# Patient Record
Sex: MALE | Race: OTHER | NOT HISPANIC OR LATINO
[De-identification: names, ages, dates, MRNs, and addresses within clinical notes are randomized per-mention and may not be internally consistent; named-entity substitution may affect disease eponyms.]

---

## 2017-03-21 ENCOUNTER — APPOINTMENT (OUTPATIENT)
Dept: CARDIOLOGY | Facility: CLINIC | Age: 57
End: 2017-03-21

## 2017-03-21 ENCOUNTER — NON-APPOINTMENT (OUTPATIENT)
Age: 57
End: 2017-03-21

## 2017-03-21 VITALS
HEIGHT: 65 IN | DIASTOLIC BLOOD PRESSURE: 80 MMHG | HEART RATE: 60 BPM | WEIGHT: 196 LBS | BODY MASS INDEX: 32.65 KG/M2 | OXYGEN SATURATION: 98 % | SYSTOLIC BLOOD PRESSURE: 140 MMHG

## 2017-03-21 DIAGNOSIS — Z86.39 PERSONAL HISTORY OF OTHER ENDOCRINE, NUTRITIONAL AND METABOLIC DISEASE: ICD-10-CM

## 2017-03-21 RX ORDER — NAPROXEN 500 MG/1
500 TABLET ORAL
Qty: 14 | Refills: 0 | Status: ACTIVE | COMMUNITY
Start: 2017-02-18

## 2017-03-21 RX ORDER — ATORVASTATIN CALCIUM 20 MG/1
20 TABLET, FILM COATED ORAL
Qty: 30 | Refills: 0 | Status: ACTIVE | COMMUNITY
Start: 2016-07-18

## 2017-10-12 ENCOUNTER — NON-APPOINTMENT (OUTPATIENT)
Age: 57
End: 2017-10-12

## 2017-10-12 ENCOUNTER — APPOINTMENT (OUTPATIENT)
Dept: CARDIOLOGY | Facility: CLINIC | Age: 57
End: 2017-10-12
Payer: COMMERCIAL

## 2017-10-12 VITALS
BODY MASS INDEX: 32.65 KG/M2 | OXYGEN SATURATION: 98 % | SYSTOLIC BLOOD PRESSURE: 142 MMHG | HEART RATE: 69 BPM | HEIGHT: 65 IN | DIASTOLIC BLOOD PRESSURE: 80 MMHG | WEIGHT: 196 LBS

## 2017-10-12 DIAGNOSIS — I10 ESSENTIAL (PRIMARY) HYPERTENSION: ICD-10-CM

## 2017-10-12 DIAGNOSIS — R73.09 OTHER ABNORMAL GLUCOSE: ICD-10-CM

## 2017-10-12 DIAGNOSIS — E66.9 OBESITY, UNSPECIFIED: ICD-10-CM

## 2017-10-12 PROCEDURE — 99214 OFFICE O/P EST MOD 30 MIN: CPT

## 2017-10-12 PROCEDURE — 93000 ELECTROCARDIOGRAM COMPLETE: CPT

## 2020-01-01 ENCOUNTER — INPATIENT (INPATIENT)
Facility: HOSPITAL | Age: 60
LOS: 7 days | DRG: 207 | End: 2020-12-13
Attending: INTERNAL MEDICINE | Admitting: STUDENT IN AN ORGANIZED HEALTH CARE EDUCATION/TRAINING PROGRAM
Payer: COMMERCIAL

## 2020-01-01 VITALS
RESPIRATION RATE: 24 BRPM | SYSTOLIC BLOOD PRESSURE: 190 MMHG | OXYGEN SATURATION: 57 % | HEART RATE: 79 BPM | TEMPERATURE: 99 F | DIASTOLIC BLOOD PRESSURE: 76 MMHG

## 2020-01-01 VITALS — RESPIRATION RATE: 35 BRPM

## 2020-01-01 DIAGNOSIS — U07.1 COVID-19: ICD-10-CM

## 2020-01-01 LAB
-  COAGULASE NEGATIVE STAPHYLOCOCCUS: SIGNIFICANT CHANGE UP
A1C WITH ESTIMATED AVERAGE GLUCOSE RESULT: 6.6 % — HIGH (ref 4–5.6)
ALBUMIN SERPL ELPH-MCNC: 1.3 G/DL — LOW (ref 3.3–5.2)
ALBUMIN SERPL ELPH-MCNC: 1.8 G/DL — LOW (ref 3.3–5.2)
ALBUMIN SERPL ELPH-MCNC: 2 G/DL — LOW (ref 3.3–5.2)
ALBUMIN SERPL ELPH-MCNC: 2.2 G/DL — LOW (ref 3.3–5.2)
ALBUMIN SERPL ELPH-MCNC: 2.9 G/DL — LOW (ref 3.3–5.2)
ALBUMIN SERPL ELPH-MCNC: 3 G/DL — LOW (ref 3.3–5.2)
ALBUMIN SERPL ELPH-MCNC: 3.1 G/DL — LOW (ref 3.3–5.2)
ALBUMIN SERPL ELPH-MCNC: 3.2 G/DL — LOW (ref 3.3–5.2)
ALBUMIN SERPL ELPH-MCNC: 3.2 G/DL — LOW (ref 3.3–5.2)
ALBUMIN SERPL ELPH-MCNC: 3.7 G/DL — SIGNIFICANT CHANGE UP (ref 3.3–5.2)
ALP SERPL-CCNC: 102 U/L — SIGNIFICANT CHANGE UP (ref 40–120)
ALP SERPL-CCNC: 105 U/L — SIGNIFICANT CHANGE UP (ref 40–120)
ALP SERPL-CCNC: 107 U/L — SIGNIFICANT CHANGE UP (ref 40–120)
ALP SERPL-CCNC: 108 U/L — SIGNIFICANT CHANGE UP (ref 40–120)
ALP SERPL-CCNC: 110 U/L — SIGNIFICANT CHANGE UP (ref 40–120)
ALP SERPL-CCNC: 116 U/L — SIGNIFICANT CHANGE UP (ref 40–120)
ALP SERPL-CCNC: 120 U/L — SIGNIFICANT CHANGE UP (ref 40–120)
ALP SERPL-CCNC: 122 U/L — HIGH (ref 40–120)
ALP SERPL-CCNC: 138 U/L — HIGH (ref 40–120)
ALP SERPL-CCNC: 139 U/L — HIGH (ref 40–120)
ALP SERPL-CCNC: 75 U/L — SIGNIFICANT CHANGE UP (ref 40–120)
ALT FLD-CCNC: 17 U/L — SIGNIFICANT CHANGE UP
ALT FLD-CCNC: 19 U/L — SIGNIFICANT CHANGE UP
ALT FLD-CCNC: 20 U/L — SIGNIFICANT CHANGE UP
ALT FLD-CCNC: 21 U/L — SIGNIFICANT CHANGE UP
ALT FLD-CCNC: 21 U/L — SIGNIFICANT CHANGE UP
ALT FLD-CCNC: 22 U/L — SIGNIFICANT CHANGE UP
ALT FLD-CCNC: 22 U/L — SIGNIFICANT CHANGE UP
ALT FLD-CCNC: 29 U/L — SIGNIFICANT CHANGE UP
ANION GAP SERPL CALC-SCNC: 10 MMOL/L — SIGNIFICANT CHANGE UP (ref 5–17)
ANION GAP SERPL CALC-SCNC: 11 MMOL/L — SIGNIFICANT CHANGE UP (ref 5–17)
ANION GAP SERPL CALC-SCNC: 12 MMOL/L — SIGNIFICANT CHANGE UP (ref 5–17)
ANION GAP SERPL CALC-SCNC: 13 MMOL/L — SIGNIFICANT CHANGE UP (ref 5–17)
ANION GAP SERPL CALC-SCNC: 14 MMOL/L — SIGNIFICANT CHANGE UP (ref 5–17)
ANION GAP SERPL CALC-SCNC: 14 MMOL/L — SIGNIFICANT CHANGE UP (ref 5–17)
ANION GAP SERPL CALC-SCNC: 22 MMOL/L — HIGH (ref 5–17)
ANISOCYTOSIS BLD QL: SLIGHT — SIGNIFICANT CHANGE UP
ANISOCYTOSIS BLD QL: SLIGHT — SIGNIFICANT CHANGE UP
APTT BLD: 27.4 SEC — LOW (ref 27.5–35.5)
APTT BLD: 49.2 SEC — HIGH (ref 27.5–35.5)
APTT BLD: 53.4 SEC — HIGH (ref 27.5–35.5)
APTT BLD: 56 SEC — HIGH (ref 27.5–35.5)
APTT BLD: 57.4 SEC — HIGH (ref 27.5–35.5)
APTT BLD: 57.9 SEC — HIGH (ref 27.5–35.5)
APTT BLD: 64.3 SEC — HIGH (ref 27.5–35.5)
APTT BLD: 92.9 SEC — HIGH (ref 27.5–35.5)
AST SERPL-CCNC: 18 U/L — SIGNIFICANT CHANGE UP
AST SERPL-CCNC: 23 U/L — SIGNIFICANT CHANGE UP
AST SERPL-CCNC: 27 U/L — SIGNIFICANT CHANGE UP
AST SERPL-CCNC: 28 U/L — SIGNIFICANT CHANGE UP
AST SERPL-CCNC: 28 U/L — SIGNIFICANT CHANGE UP
AST SERPL-CCNC: 29 U/L — SIGNIFICANT CHANGE UP
AST SERPL-CCNC: 30 U/L — SIGNIFICANT CHANGE UP
AST SERPL-CCNC: 34 U/L — SIGNIFICANT CHANGE UP
AST SERPL-CCNC: 38 U/L — SIGNIFICANT CHANGE UP
AST SERPL-CCNC: 40 U/L — HIGH
AST SERPL-CCNC: 42 U/L — HIGH
AST SERPL-CCNC: 46 U/L — HIGH
AST SERPL-CCNC: 50 U/L — HIGH
BASE EXCESS BLDA CALC-SCNC: 5.9 MMOL/L — HIGH (ref -3–3)
BASE EXCESS BLDA CALC-SCNC: 7 MMOL/L — HIGH (ref -3–3)
BASOPHILS # BLD AUTO: 0 K/UL — SIGNIFICANT CHANGE UP (ref 0–0.2)
BASOPHILS # BLD AUTO: 0 K/UL — SIGNIFICANT CHANGE UP (ref 0–0.2)
BASOPHILS # BLD AUTO: 0.01 K/UL — SIGNIFICANT CHANGE UP (ref 0–0.2)
BASOPHILS NFR BLD AUTO: 0 % — SIGNIFICANT CHANGE UP (ref 0–2)
BASOPHILS NFR BLD AUTO: 0 % — SIGNIFICANT CHANGE UP (ref 0–2)
BASOPHILS NFR BLD AUTO: 0.1 % — SIGNIFICANT CHANGE UP (ref 0–2)
BILIRUB DIRECT SERPL-MCNC: 0.1 MG/DL — SIGNIFICANT CHANGE UP (ref 0–0.3)
BILIRUB DIRECT SERPL-MCNC: 0.1 MG/DL — SIGNIFICANT CHANGE UP (ref 0–0.3)
BILIRUB DIRECT SERPL-MCNC: 0.2 MG/DL — SIGNIFICANT CHANGE UP (ref 0–0.3)
BILIRUB DIRECT SERPL-MCNC: 0.2 MG/DL — SIGNIFICANT CHANGE UP (ref 0–0.3)
BILIRUB DIRECT SERPL-MCNC: 1 MG/DL — HIGH (ref 0–0.3)
BILIRUB INDIRECT FLD-MCNC: 0.1 MG/DL — LOW (ref 0.2–1)
BILIRUB INDIRECT FLD-MCNC: 0.3 MG/DL — SIGNIFICANT CHANGE UP (ref 0.2–1)
BILIRUB INDIRECT FLD-MCNC: 0.5 MG/DL — SIGNIFICANT CHANGE UP (ref 0.2–1)
BILIRUB SERPL-MCNC: 0.4 MG/DL — SIGNIFICANT CHANGE UP (ref 0.4–2)
BILIRUB SERPL-MCNC: 0.5 MG/DL — SIGNIFICANT CHANGE UP (ref 0.4–2)
BILIRUB SERPL-MCNC: 0.7 MG/DL — SIGNIFICANT CHANGE UP (ref 0.4–2)
BILIRUB SERPL-MCNC: 1.2 MG/DL — SIGNIFICANT CHANGE UP (ref 0.4–2)
BLOOD GAS COMMENTS ARTERIAL: SIGNIFICANT CHANGE UP
BLOOD GAS COMMENTS ARTERIAL: SIGNIFICANT CHANGE UP
BUN SERPL-MCNC: 100 MG/DL — HIGH (ref 8–20)
BUN SERPL-MCNC: 102 MG/DL — HIGH (ref 8–20)
BUN SERPL-MCNC: 110 MG/DL — HIGH (ref 8–20)
BUN SERPL-MCNC: 14 MG/DL — SIGNIFICANT CHANGE UP (ref 8–20)
BUN SERPL-MCNC: 18 MG/DL — SIGNIFICANT CHANGE UP (ref 8–20)
BUN SERPL-MCNC: 20 MG/DL — SIGNIFICANT CHANGE UP (ref 8–20)
BUN SERPL-MCNC: 23 MG/DL — HIGH (ref 8–20)
BUN SERPL-MCNC: 25 MG/DL — HIGH (ref 8–20)
BUN SERPL-MCNC: 46 MG/DL — HIGH (ref 8–20)
BUN SERPL-MCNC: 73 MG/DL — HIGH (ref 8–20)
BUN SERPL-MCNC: 94 MG/DL — HIGH (ref 8–20)
CALCIUM SERPL-MCNC: 6.5 MG/DL — CRITICAL LOW (ref 8.6–10.2)
CALCIUM SERPL-MCNC: 6.9 MG/DL — LOW (ref 8.6–10.2)
CALCIUM SERPL-MCNC: 7.7 MG/DL — LOW (ref 8.6–10.2)
CALCIUM SERPL-MCNC: 7.7 MG/DL — LOW (ref 8.6–10.2)
CALCIUM SERPL-MCNC: 7.8 MG/DL — LOW (ref 8.6–10.2)
CALCIUM SERPL-MCNC: 7.9 MG/DL — LOW (ref 8.6–10.2)
CALCIUM SERPL-MCNC: 8.1 MG/DL — LOW (ref 8.6–10.2)
CALCIUM SERPL-MCNC: 8.3 MG/DL — LOW (ref 8.6–10.2)
CALCIUM SERPL-MCNC: 8.4 MG/DL — LOW (ref 8.6–10.2)
CALCIUM SERPL-MCNC: 8.4 MG/DL — LOW (ref 8.6–10.2)
CALCIUM SERPL-MCNC: 8.6 MG/DL — SIGNIFICANT CHANGE UP (ref 8.6–10.2)
CHLORIDE SERPL-SCNC: 100 MMOL/L — SIGNIFICANT CHANGE UP (ref 98–107)
CHLORIDE SERPL-SCNC: 100 MMOL/L — SIGNIFICANT CHANGE UP (ref 98–107)
CHLORIDE SERPL-SCNC: 101 MMOL/L — SIGNIFICANT CHANGE UP (ref 98–107)
CHLORIDE SERPL-SCNC: 102 MMOL/L — SIGNIFICANT CHANGE UP (ref 98–107)
CHLORIDE SERPL-SCNC: 103 MMOL/L — SIGNIFICANT CHANGE UP (ref 98–107)
CHLORIDE SERPL-SCNC: 104 MMOL/L — SIGNIFICANT CHANGE UP (ref 98–107)
CHLORIDE SERPL-SCNC: 105 MMOL/L — SIGNIFICANT CHANGE UP (ref 98–107)
CHLORIDE SERPL-SCNC: 106 MMOL/L — SIGNIFICANT CHANGE UP (ref 98–107)
CHLORIDE SERPL-SCNC: 109 MMOL/L — HIGH (ref 98–107)
CHOLEST SERPL-MCNC: 139 MG/DL — SIGNIFICANT CHANGE UP
CO2 SERPL-SCNC: 23 MMOL/L — SIGNIFICANT CHANGE UP (ref 22–29)
CO2 SERPL-SCNC: 23 MMOL/L — SIGNIFICANT CHANGE UP (ref 22–29)
CO2 SERPL-SCNC: 24 MMOL/L — SIGNIFICANT CHANGE UP (ref 22–29)
CO2 SERPL-SCNC: 25 MMOL/L — SIGNIFICANT CHANGE UP (ref 22–29)
CO2 SERPL-SCNC: 25 MMOL/L — SIGNIFICANT CHANGE UP (ref 22–29)
CO2 SERPL-SCNC: 26 MMOL/L — SIGNIFICANT CHANGE UP (ref 22–29)
CO2 SERPL-SCNC: 27 MMOL/L — SIGNIFICANT CHANGE UP (ref 22–29)
CO2 SERPL-SCNC: 28 MMOL/L — SIGNIFICANT CHANGE UP (ref 22–29)
CO2 SERPL-SCNC: 31 MMOL/L — HIGH (ref 22–29)
CREAT SERPL-MCNC: 0.64 MG/DL — SIGNIFICANT CHANGE UP (ref 0.5–1.3)
CREAT SERPL-MCNC: 0.67 MG/DL — SIGNIFICANT CHANGE UP (ref 0.5–1.3)
CREAT SERPL-MCNC: 0.68 MG/DL — SIGNIFICANT CHANGE UP (ref 0.5–1.3)
CREAT SERPL-MCNC: 0.7 MG/DL — SIGNIFICANT CHANGE UP (ref 0.5–1.3)
CREAT SERPL-MCNC: 0.72 MG/DL — SIGNIFICANT CHANGE UP (ref 0.5–1.3)
CREAT SERPL-MCNC: 0.84 MG/DL — SIGNIFICANT CHANGE UP (ref 0.5–1.3)
CREAT SERPL-MCNC: 2.29 MG/DL — HIGH (ref 0.5–1.3)
CREAT SERPL-MCNC: 3.89 MG/DL — HIGH (ref 0.5–1.3)
CREAT SERPL-MCNC: 4.36 MG/DL — HIGH (ref 0.5–1.3)
CREAT SERPL-MCNC: 4.37 MG/DL — HIGH (ref 0.5–1.3)
CREAT SERPL-MCNC: 4.39 MG/DL — HIGH (ref 0.5–1.3)
CREAT SERPL-MCNC: 5.71 MG/DL — HIGH (ref 0.5–1.3)
CRP SERPL-MCNC: 15.81 MG/DL — HIGH (ref 0–0.4)
CRP SERPL-MCNC: 18.21 MG/DL — HIGH (ref 0–0.4)
CRP SERPL-MCNC: 31.85 MG/DL — HIGH (ref 0–0.4)
D DIMER BLD IA.RAPID-MCNC: 3495 NG/ML DDU — HIGH
D DIMER BLD IA.RAPID-MCNC: 411 NG/ML DDU — HIGH
D DIMER BLD IA.RAPID-MCNC: HIGH NG/ML DDU
EOSINOPHIL # BLD AUTO: 0 K/UL — SIGNIFICANT CHANGE UP (ref 0–0.5)
EOSINOPHIL NFR BLD AUTO: 0 % — SIGNIFICANT CHANGE UP (ref 0–6)
EOSINOPHIL NFR BLD AUTO: 1 % — SIGNIFICANT CHANGE UP (ref 0–6)
ESTIMATED AVERAGE GLUCOSE: 143 MG/DL — HIGH (ref 68–114)
FERRITIN SERPL-MCNC: 532 NG/ML — HIGH (ref 30–400)
FERRITIN SERPL-MCNC: 774 NG/ML — HIGH (ref 30–400)
FERRITIN SERPL-MCNC: 794 NG/ML — HIGH (ref 30–400)
FLUAV AG NPH QL: SIGNIFICANT CHANGE UP
FLUBV AG NPH QL: SIGNIFICANT CHANGE UP
GAS PNL BLDA: SIGNIFICANT CHANGE UP
GIANT PLATELETS BLD QL SMEAR: PRESENT — SIGNIFICANT CHANGE UP
GLUCOSE BLDC GLUCOMTR-MCNC: 107 MG/DL — HIGH (ref 70–99)
GLUCOSE BLDC GLUCOMTR-MCNC: 112 MG/DL — HIGH (ref 70–99)
GLUCOSE BLDC GLUCOMTR-MCNC: 116 MG/DL — HIGH (ref 70–99)
GLUCOSE BLDC GLUCOMTR-MCNC: 154 MG/DL — HIGH (ref 70–99)
GLUCOSE BLDC GLUCOMTR-MCNC: 158 MG/DL — HIGH (ref 70–99)
GLUCOSE BLDC GLUCOMTR-MCNC: 167 MG/DL — HIGH (ref 70–99)
GLUCOSE BLDC GLUCOMTR-MCNC: 174 MG/DL — HIGH (ref 70–99)
GLUCOSE BLDC GLUCOMTR-MCNC: 176 MG/DL — HIGH (ref 70–99)
GLUCOSE BLDC GLUCOMTR-MCNC: 179 MG/DL — HIGH (ref 70–99)
GLUCOSE BLDC GLUCOMTR-MCNC: 181 MG/DL — HIGH (ref 70–99)
GLUCOSE BLDC GLUCOMTR-MCNC: 182 MG/DL — HIGH (ref 70–99)
GLUCOSE BLDC GLUCOMTR-MCNC: 186 MG/DL — HIGH (ref 70–99)
GLUCOSE BLDC GLUCOMTR-MCNC: 189 MG/DL — HIGH (ref 70–99)
GLUCOSE BLDC GLUCOMTR-MCNC: 190 MG/DL — HIGH (ref 70–99)
GLUCOSE BLDC GLUCOMTR-MCNC: 191 MG/DL — HIGH (ref 70–99)
GLUCOSE BLDC GLUCOMTR-MCNC: 191 MG/DL — HIGH (ref 70–99)
GLUCOSE BLDC GLUCOMTR-MCNC: 192 MG/DL — HIGH (ref 70–99)
GLUCOSE BLDC GLUCOMTR-MCNC: 192 MG/DL — HIGH (ref 70–99)
GLUCOSE BLDC GLUCOMTR-MCNC: 194 MG/DL — HIGH (ref 70–99)
GLUCOSE BLDC GLUCOMTR-MCNC: 197 MG/DL — HIGH (ref 70–99)
GLUCOSE BLDC GLUCOMTR-MCNC: 201 MG/DL — HIGH (ref 70–99)
GLUCOSE BLDC GLUCOMTR-MCNC: 206 MG/DL — HIGH (ref 70–99)
GLUCOSE BLDC GLUCOMTR-MCNC: 208 MG/DL — HIGH (ref 70–99)
GLUCOSE BLDC GLUCOMTR-MCNC: 208 MG/DL — HIGH (ref 70–99)
GLUCOSE BLDC GLUCOMTR-MCNC: 217 MG/DL — HIGH (ref 70–99)
GLUCOSE BLDC GLUCOMTR-MCNC: 219 MG/DL — HIGH (ref 70–99)
GLUCOSE BLDC GLUCOMTR-MCNC: 219 MG/DL — HIGH (ref 70–99)
GLUCOSE BLDC GLUCOMTR-MCNC: 223 MG/DL — HIGH (ref 70–99)
GLUCOSE BLDC GLUCOMTR-MCNC: 224 MG/DL — HIGH (ref 70–99)
GLUCOSE BLDC GLUCOMTR-MCNC: 239 MG/DL — HIGH (ref 70–99)
GLUCOSE BLDC GLUCOMTR-MCNC: 247 MG/DL — HIGH (ref 70–99)
GLUCOSE BLDC GLUCOMTR-MCNC: 278 MG/DL — HIGH (ref 70–99)
GLUCOSE BLDC GLUCOMTR-MCNC: 278 MG/DL — HIGH (ref 70–99)
GLUCOSE BLDC GLUCOMTR-MCNC: 280 MG/DL — HIGH (ref 70–99)
GLUCOSE BLDC GLUCOMTR-MCNC: 281 MG/DL — HIGH (ref 70–99)
GLUCOSE BLDC GLUCOMTR-MCNC: 286 MG/DL — HIGH (ref 70–99)
GLUCOSE BLDC GLUCOMTR-MCNC: 292 MG/DL — HIGH (ref 70–99)
GLUCOSE BLDC GLUCOMTR-MCNC: 296 MG/DL — HIGH (ref 70–99)
GLUCOSE BLDC GLUCOMTR-MCNC: 301 MG/DL — HIGH (ref 70–99)
GLUCOSE BLDC GLUCOMTR-MCNC: 89 MG/DL — SIGNIFICANT CHANGE UP (ref 70–99)
GLUCOSE BLDC GLUCOMTR-MCNC: 96 MG/DL — SIGNIFICANT CHANGE UP (ref 70–99)
GLUCOSE SERPL-MCNC: 112 MG/DL — HIGH (ref 70–99)
GLUCOSE SERPL-MCNC: 139 MG/DL — HIGH (ref 70–99)
GLUCOSE SERPL-MCNC: 159 MG/DL — HIGH (ref 70–99)
GLUCOSE SERPL-MCNC: 179 MG/DL — HIGH (ref 70–99)
GLUCOSE SERPL-MCNC: 179 MG/DL — HIGH (ref 70–99)
GLUCOSE SERPL-MCNC: 185 MG/DL — HIGH (ref 70–99)
GLUCOSE SERPL-MCNC: 197 MG/DL — HIGH (ref 70–99)
GLUCOSE SERPL-MCNC: 198 MG/DL — HIGH (ref 70–99)
GLUCOSE SERPL-MCNC: 207 MG/DL — HIGH (ref 70–99)
GLUCOSE SERPL-MCNC: 292 MG/DL — HIGH (ref 70–99)
GLUCOSE SERPL-MCNC: 315 MG/DL — HIGH (ref 70–99)
GRAM STN FLD: SIGNIFICANT CHANGE UP
GRAM STN FLD: SIGNIFICANT CHANGE UP
HCO3 BLDA-SCNC: 30 MMOL/L — HIGH (ref 20–26)
HCO3 BLDA-SCNC: 31 MMOL/L — HIGH (ref 20–26)
HCT VFR BLD CALC: 31.7 % — LOW (ref 39–50)
HCT VFR BLD CALC: 33.9 % — LOW (ref 39–50)
HCT VFR BLD CALC: 35.2 % — LOW (ref 39–50)
HCT VFR BLD CALC: 37.6 % — LOW (ref 39–50)
HCT VFR BLD CALC: 39.3 % — SIGNIFICANT CHANGE UP (ref 39–50)
HCT VFR BLD CALC: 40.5 % — SIGNIFICANT CHANGE UP (ref 39–50)
HCT VFR BLD CALC: 40.6 % — SIGNIFICANT CHANGE UP (ref 39–50)
HCT VFR BLD CALC: 41.5 % — SIGNIFICANT CHANGE UP (ref 39–50)
HCV AB S/CO SERPL IA: 0.53 S/CO — SIGNIFICANT CHANGE UP (ref 0–0.99)
HCV AB SERPL-IMP: SIGNIFICANT CHANGE UP
HDLC SERPL-MCNC: 46 MG/DL — SIGNIFICANT CHANGE UP
HGB BLD-MCNC: 10.5 G/DL — LOW (ref 13–17)
HGB BLD-MCNC: 11.2 G/DL — LOW (ref 13–17)
HGB BLD-MCNC: 11.9 G/DL — LOW (ref 13–17)
HGB BLD-MCNC: 12.9 G/DL — LOW (ref 13–17)
HGB BLD-MCNC: 13.6 G/DL — SIGNIFICANT CHANGE UP (ref 13–17)
HGB BLD-MCNC: 13.9 G/DL — SIGNIFICANT CHANGE UP (ref 13–17)
HOROWITZ INDEX BLDA+IHG-RTO: 70 — SIGNIFICANT CHANGE UP
HOROWITZ INDEX BLDA+IHG-RTO: SIGNIFICANT CHANGE UP
IMM GRANULOCYTES NFR BLD AUTO: 0.6 % — SIGNIFICANT CHANGE UP (ref 0–1.5)
INR BLD: 1.31 RATIO — HIGH (ref 0.88–1.16)
LDH SERPL L TO P-CCNC: 1074 U/L — HIGH (ref 98–192)
LDH SERPL L TO P-CCNC: 780 U/L — HIGH (ref 98–192)
LEGIONELLA AG UR QL: NEGATIVE — SIGNIFICANT CHANGE UP
LIPID PNL WITH DIRECT LDL SERPL: 76 MG/DL — SIGNIFICANT CHANGE UP
LYMPHOCYTES # BLD AUTO: 0 % — LOW (ref 13–44)
LYMPHOCYTES # BLD AUTO: 0 K/UL — LOW (ref 1–3.3)
LYMPHOCYTES # BLD AUTO: 0.23 K/UL — LOW (ref 1–3.3)
LYMPHOCYTES # BLD AUTO: 1.18 K/UL — SIGNIFICANT CHANGE UP (ref 1–3.3)
LYMPHOCYTES # BLD AUTO: 13.7 % — SIGNIFICANT CHANGE UP (ref 13–44)
LYMPHOCYTES # BLD AUTO: 2.6 % — LOW (ref 13–44)
LYMPHOCYTES # BLD AUTO: 5 % — LOW (ref 13–44)
LYMPHOCYTES # BLD AUTO: 8 % — LOW (ref 13–44)
MAGNESIUM SERPL-MCNC: 2.5 MG/DL — SIGNIFICANT CHANGE UP (ref 1.6–2.6)
MAGNESIUM SERPL-MCNC: 2.6 MG/DL — SIGNIFICANT CHANGE UP (ref 1.6–2.6)
MAGNESIUM SERPL-MCNC: 2.9 MG/DL — HIGH (ref 1.6–2.6)
MAGNESIUM SERPL-MCNC: 3.1 MG/DL — HIGH (ref 1.6–2.6)
MAGNESIUM SERPL-MCNC: 3.1 MG/DL — HIGH (ref 1.6–2.6)
MAGNESIUM SERPL-MCNC: 3.4 MG/DL — HIGH (ref 1.6–2.6)
MANUAL SMEAR VERIFICATION: SIGNIFICANT CHANGE UP
MCHC RBC-ENTMCNC: 31.1 PG — SIGNIFICANT CHANGE UP (ref 27–34)
MCHC RBC-ENTMCNC: 31.2 PG — SIGNIFICANT CHANGE UP (ref 27–34)
MCHC RBC-ENTMCNC: 31.2 PG — SIGNIFICANT CHANGE UP (ref 27–34)
MCHC RBC-ENTMCNC: 31.3 PG — SIGNIFICANT CHANGE UP (ref 27–34)
MCHC RBC-ENTMCNC: 31.5 PG — SIGNIFICANT CHANGE UP (ref 27–34)
MCHC RBC-ENTMCNC: 31.8 PG — SIGNIFICANT CHANGE UP (ref 27–34)
MCHC RBC-ENTMCNC: 33 GM/DL — SIGNIFICANT CHANGE UP (ref 32–36)
MCHC RBC-ENTMCNC: 33.1 GM/DL — SIGNIFICANT CHANGE UP (ref 32–36)
MCHC RBC-ENTMCNC: 33.5 GM/DL — SIGNIFICANT CHANGE UP (ref 32–36)
MCHC RBC-ENTMCNC: 33.8 GM/DL — SIGNIFICANT CHANGE UP (ref 32–36)
MCHC RBC-ENTMCNC: 34.2 GM/DL — SIGNIFICANT CHANGE UP (ref 32–36)
MCHC RBC-ENTMCNC: 34.3 GM/DL — SIGNIFICANT CHANGE UP (ref 32–36)
MCHC RBC-ENTMCNC: 34.3 GM/DL — SIGNIFICANT CHANGE UP (ref 32–36)
MCHC RBC-ENTMCNC: 34.6 GM/DL — SIGNIFICANT CHANGE UP (ref 32–36)
MCV RBC AUTO: 90.6 FL — SIGNIFICANT CHANGE UP (ref 80–100)
MCV RBC AUTO: 91 FL — SIGNIFICANT CHANGE UP (ref 80–100)
MCV RBC AUTO: 91.8 FL — SIGNIFICANT CHANGE UP (ref 80–100)
MCV RBC AUTO: 92.1 FL — SIGNIFICANT CHANGE UP (ref 80–100)
MCV RBC AUTO: 93 FL — SIGNIFICANT CHANGE UP (ref 80–100)
MCV RBC AUTO: 94.1 FL — SIGNIFICANT CHANGE UP (ref 80–100)
MCV RBC AUTO: 94.3 FL — SIGNIFICANT CHANGE UP (ref 80–100)
MCV RBC AUTO: 96.3 FL — SIGNIFICANT CHANGE UP (ref 80–100)
METAMYELOCYTES # FLD: 0.9 % — HIGH (ref 0–0)
METHOD TYPE: SIGNIFICANT CHANGE UP
MONOCYTES # BLD AUTO: 0.15 K/UL — SIGNIFICANT CHANGE UP (ref 0–0.9)
MONOCYTES # BLD AUTO: 0.41 K/UL — SIGNIFICANT CHANGE UP (ref 0–0.9)
MONOCYTES # BLD AUTO: 1.12 K/UL — HIGH (ref 0–0.9)
MONOCYTES NFR BLD AUTO: 1.7 % — LOW (ref 2–14)
MONOCYTES NFR BLD AUTO: 3 % — SIGNIFICANT CHANGE UP (ref 2–14)
MONOCYTES NFR BLD AUTO: 3 % — SIGNIFICANT CHANGE UP (ref 2–14)
MONOCYTES NFR BLD AUTO: 4.8 % — SIGNIFICANT CHANGE UP (ref 2–14)
MONOCYTES NFR BLD AUTO: 5 % — SIGNIFICANT CHANGE UP (ref 2–14)
NEUTROPHILS # BLD AUTO: 21.2 K/UL — HIGH (ref 1.8–7.4)
NEUTROPHILS # BLD AUTO: 6.98 K/UL — SIGNIFICANT CHANGE UP (ref 1.8–7.4)
NEUTROPHILS # BLD AUTO: 8.22 K/UL — HIGH (ref 1.8–7.4)
NEUTROPHILS NFR BLD AUTO: 80.8 % — HIGH (ref 43–77)
NEUTROPHILS NFR BLD AUTO: 86 % — HIGH (ref 43–77)
NEUTROPHILS NFR BLD AUTO: 90 % — HIGH (ref 43–77)
NEUTROPHILS NFR BLD AUTO: 91 % — HIGH (ref 43–77)
NEUTROPHILS NFR BLD AUTO: 93.9 % — HIGH (ref 43–77)
NEUTS BAND # BLD: 1 % — SIGNIFICANT CHANGE UP (ref 0–8)
NEUTS BAND # BLD: 5 % — SIGNIFICANT CHANGE UP (ref 0–8)
NON HDL CHOLESTEROL: 93 MG/DL — SIGNIFICANT CHANGE UP
NRBC # BLD: 0 /100 — SIGNIFICANT CHANGE UP (ref 0–0)
ORGANISM # SPEC MICROSCOPIC CNT: SIGNIFICANT CHANGE UP
OVALOCYTES BLD QL SMEAR: SLIGHT — SIGNIFICANT CHANGE UP
PCO2 BLDA: 34 MMHG — LOW (ref 35–45)
PCO2 BLDA: 54 MMHG — HIGH (ref 35–45)
PH BLDA: 7.38 — SIGNIFICANT CHANGE UP (ref 7.35–7.45)
PH BLDA: 7.54 — HIGH (ref 7.35–7.45)
PHOSPHATE SERPL-MCNC: 11.4 MG/DL — HIGH (ref 2.4–4.7)
PHOSPHATE SERPL-MCNC: 2.4 MG/DL — SIGNIFICANT CHANGE UP (ref 2.4–4.7)
PHOSPHATE SERPL-MCNC: 5.2 MG/DL — HIGH (ref 2.4–4.7)
PHOSPHATE SERPL-MCNC: 7.3 MG/DL — HIGH (ref 2.4–4.7)
PHOSPHATE SERPL-MCNC: 7.4 MG/DL — HIGH (ref 2.4–4.7)
PHOSPHATE SERPL-MCNC: 7.7 MG/DL — HIGH (ref 2.4–4.7)
PLAT MORPH BLD: ABNORMAL
PLAT MORPH BLD: NORMAL — SIGNIFICANT CHANGE UP
PLATELET # BLD AUTO: 149 K/UL — LOW (ref 150–400)
PLATELET # BLD AUTO: 150 K/UL — SIGNIFICANT CHANGE UP (ref 150–400)
PLATELET # BLD AUTO: 170 K/UL — SIGNIFICANT CHANGE UP (ref 150–400)
PLATELET # BLD AUTO: 198 K/UL — SIGNIFICANT CHANGE UP (ref 150–400)
PLATELET # BLD AUTO: 215 K/UL — SIGNIFICANT CHANGE UP (ref 150–400)
PLATELET # BLD AUTO: 218 K/UL — SIGNIFICANT CHANGE UP (ref 150–400)
PLATELET # BLD AUTO: 226 K/UL — SIGNIFICANT CHANGE UP (ref 150–400)
PLATELET # BLD AUTO: 236 K/UL — SIGNIFICANT CHANGE UP (ref 150–400)
PO2 BLDA: 103 MMHG — SIGNIFICANT CHANGE UP (ref 83–108)
PO2 BLDA: 77 MMHG — LOW (ref 83–108)
POLYCHROMASIA BLD QL SMEAR: SLIGHT — SIGNIFICANT CHANGE UP
POLYCHROMASIA BLD QL SMEAR: SLIGHT — SIGNIFICANT CHANGE UP
POTASSIUM SERPL-MCNC: 3.2 MMOL/L — LOW (ref 3.5–5.3)
POTASSIUM SERPL-MCNC: 3.5 MMOL/L — SIGNIFICANT CHANGE UP (ref 3.5–5.3)
POTASSIUM SERPL-MCNC: 3.5 MMOL/L — SIGNIFICANT CHANGE UP (ref 3.5–5.3)
POTASSIUM SERPL-MCNC: 3.7 MMOL/L — SIGNIFICANT CHANGE UP (ref 3.5–5.3)
POTASSIUM SERPL-MCNC: 3.8 MMOL/L — SIGNIFICANT CHANGE UP (ref 3.5–5.3)
POTASSIUM SERPL-MCNC: 4 MMOL/L — SIGNIFICANT CHANGE UP (ref 3.5–5.3)
POTASSIUM SERPL-MCNC: 4.1 MMOL/L — SIGNIFICANT CHANGE UP (ref 3.5–5.3)
POTASSIUM SERPL-MCNC: 4.3 MMOL/L — SIGNIFICANT CHANGE UP (ref 3.5–5.3)
POTASSIUM SERPL-MCNC: 4.3 MMOL/L — SIGNIFICANT CHANGE UP (ref 3.5–5.3)
POTASSIUM SERPL-MCNC: 4.5 MMOL/L — SIGNIFICANT CHANGE UP (ref 3.5–5.3)
POTASSIUM SERPL-MCNC: 4.8 MMOL/L — SIGNIFICANT CHANGE UP (ref 3.5–5.3)
POTASSIUM SERPL-SCNC: 3.2 MMOL/L — LOW (ref 3.5–5.3)
POTASSIUM SERPL-SCNC: 3.5 MMOL/L — SIGNIFICANT CHANGE UP (ref 3.5–5.3)
POTASSIUM SERPL-SCNC: 3.5 MMOL/L — SIGNIFICANT CHANGE UP (ref 3.5–5.3)
POTASSIUM SERPL-SCNC: 3.7 MMOL/L — SIGNIFICANT CHANGE UP (ref 3.5–5.3)
POTASSIUM SERPL-SCNC: 3.8 MMOL/L — SIGNIFICANT CHANGE UP (ref 3.5–5.3)
POTASSIUM SERPL-SCNC: 4 MMOL/L — SIGNIFICANT CHANGE UP (ref 3.5–5.3)
POTASSIUM SERPL-SCNC: 4.1 MMOL/L — SIGNIFICANT CHANGE UP (ref 3.5–5.3)
POTASSIUM SERPL-SCNC: 4.3 MMOL/L — SIGNIFICANT CHANGE UP (ref 3.5–5.3)
POTASSIUM SERPL-SCNC: 4.3 MMOL/L — SIGNIFICANT CHANGE UP (ref 3.5–5.3)
POTASSIUM SERPL-SCNC: 4.5 MMOL/L — SIGNIFICANT CHANGE UP (ref 3.5–5.3)
POTASSIUM SERPL-SCNC: 4.8 MMOL/L — SIGNIFICANT CHANGE UP (ref 3.5–5.3)
PROCALCITONIN SERPL-MCNC: 0.21 NG/ML — HIGH (ref 0.02–0.1)
PROCALCITONIN SERPL-MCNC: 0.44 NG/ML — HIGH (ref 0.02–0.1)
PROCALCITONIN SERPL-MCNC: 0.92 NG/ML — HIGH (ref 0.02–0.1)
PROCALCITONIN SERPL-MCNC: 8.08 NG/ML — HIGH (ref 0.02–0.1)
PROT SERPL-MCNC: 4.9 G/DL — LOW (ref 6.6–8.7)
PROT SERPL-MCNC: 5.8 G/DL — LOW (ref 6.6–8.7)
PROT SERPL-MCNC: 5.9 G/DL — LOW (ref 6.6–8.7)
PROT SERPL-MCNC: 6.3 G/DL — LOW (ref 6.6–8.7)
PROT SERPL-MCNC: 6.4 G/DL — LOW (ref 6.6–8.7)
PROT SERPL-MCNC: 6.4 G/DL — LOW (ref 6.6–8.7)
PROT SERPL-MCNC: 6.6 G/DL — SIGNIFICANT CHANGE UP (ref 6.6–8.7)
PROT SERPL-MCNC: 6.8 G/DL — SIGNIFICANT CHANGE UP (ref 6.6–8.7)
PROT SERPL-MCNC: 6.9 G/DL — SIGNIFICANT CHANGE UP (ref 6.6–8.7)
PROT SERPL-MCNC: 7 G/DL — SIGNIFICANT CHANGE UP (ref 6.6–8.7)
PROT SERPL-MCNC: 7.2 G/DL — SIGNIFICANT CHANGE UP (ref 6.6–8.7)
PROTHROM AB SERPL-ACNC: 15 SEC — HIGH (ref 10.6–13.6)
RBC # BLD: 3.36 M/UL — LOW (ref 4.2–5.8)
RBC # BLD: 3.52 M/UL — LOW (ref 4.2–5.8)
RBC # BLD: 3.74 M/UL — LOW (ref 4.2–5.8)
RBC # BLD: 4.13 M/UL — LOW (ref 4.2–5.8)
RBC # BLD: 4.28 M/UL — SIGNIFICANT CHANGE UP (ref 4.2–5.8)
RBC # BLD: 4.41 M/UL — SIGNIFICANT CHANGE UP (ref 4.2–5.8)
RBC # BLD: 4.46 M/UL — SIGNIFICANT CHANGE UP (ref 4.2–5.8)
RBC # BLD: 4.47 M/UL — SIGNIFICANT CHANGE UP (ref 4.2–5.8)
RBC # FLD: 12.3 % — SIGNIFICANT CHANGE UP (ref 10.3–14.5)
RBC # FLD: 12.6 % — SIGNIFICANT CHANGE UP (ref 10.3–14.5)
RBC # FLD: 12.7 % — SIGNIFICANT CHANGE UP (ref 10.3–14.5)
RBC # FLD: 13 % — SIGNIFICANT CHANGE UP (ref 10.3–14.5)
RBC # FLD: 13.3 % — SIGNIFICANT CHANGE UP (ref 10.3–14.5)
RBC # FLD: 13.6 % — SIGNIFICANT CHANGE UP (ref 10.3–14.5)
RBC BLD AUTO: NORMAL — SIGNIFICANT CHANGE UP
RSV RNA NPH QL NAA+NON-PROBE: SIGNIFICANT CHANGE UP
SAO2 % BLDA: 95 % — SIGNIFICANT CHANGE UP (ref 95–99)
SAO2 % BLDA: 99 % — SIGNIFICANT CHANGE UP (ref 95–99)
SARS-COV-2 IGG SERPL QL IA: NEGATIVE — SIGNIFICANT CHANGE UP
SARS-COV-2 IGM SERPL IA-ACNC: 0.21 INDEX — SIGNIFICANT CHANGE UP
SARS-COV-2 RNA SPEC QL NAA+PROBE: DETECTED
SMUDGE CELLS # BLD: PRESENT — SIGNIFICANT CHANGE UP
SODIUM SERPL-SCNC: 140 MMOL/L — SIGNIFICANT CHANGE UP (ref 135–145)
SODIUM SERPL-SCNC: 141 MMOL/L — SIGNIFICANT CHANGE UP (ref 135–145)
SODIUM SERPL-SCNC: 142 MMOL/L — SIGNIFICANT CHANGE UP (ref 135–145)
SODIUM SERPL-SCNC: 144 MMOL/L — SIGNIFICANT CHANGE UP (ref 135–145)
SODIUM SERPL-SCNC: 144 MMOL/L — SIGNIFICANT CHANGE UP (ref 135–145)
SODIUM SERPL-SCNC: 149 MMOL/L — HIGH (ref 135–145)
SPECIMEN SOURCE: SIGNIFICANT CHANGE UP
SPECIMEN SOURCE: SIGNIFICANT CHANGE UP
TRIGL SERPL-MCNC: 137 MG/DL — SIGNIFICANT CHANGE UP
TRIGL SERPL-MCNC: 190 MG/DL — HIGH
TRIGL SERPL-MCNC: 203 MG/DL — HIGH
TRIGL SERPL-MCNC: 84 MG/DL — SIGNIFICANT CHANGE UP
VANCOMYCIN TROUGH SERPL-MCNC: 15.3 UG/ML — SIGNIFICANT CHANGE UP (ref 10–20)
VARIANT LYMPHS # BLD: 0.9 % — SIGNIFICANT CHANGE UP (ref 0–6)
VARIANT LYMPHS # BLD: 1 % — SIGNIFICANT CHANGE UP (ref 0–6)
VARIANT LYMPHS # BLD: 1 % — SIGNIFICANT CHANGE UP (ref 0–6)
WBC # BLD: 13.1 K/UL — HIGH (ref 3.8–10.5)
WBC # BLD: 15.48 K/UL — HIGH (ref 3.8–10.5)
WBC # BLD: 17.83 K/UL — HIGH (ref 3.8–10.5)
WBC # BLD: 22.32 K/UL — HIGH (ref 3.8–10.5)
WBC # BLD: 24.78 K/UL — HIGH (ref 3.8–10.5)
WBC # BLD: 31.07 K/UL — HIGH (ref 3.8–10.5)
WBC # BLD: 8.63 K/UL — SIGNIFICANT CHANGE UP (ref 3.8–10.5)
WBC # BLD: 8.75 K/UL — SIGNIFICANT CHANGE UP (ref 3.8–10.5)
WBC # FLD AUTO: 13.1 K/UL — HIGH (ref 3.8–10.5)
WBC # FLD AUTO: 15.48 K/UL — HIGH (ref 3.8–10.5)
WBC # FLD AUTO: 17.83 K/UL — HIGH (ref 3.8–10.5)
WBC # FLD AUTO: 22.32 K/UL — HIGH (ref 3.8–10.5)
WBC # FLD AUTO: 24.78 K/UL — HIGH (ref 3.8–10.5)
WBC # FLD AUTO: 31.07 K/UL — HIGH (ref 3.8–10.5)
WBC # FLD AUTO: 8.63 K/UL — SIGNIFICANT CHANGE UP (ref 3.8–10.5)
WBC # FLD AUTO: 8.75 K/UL — SIGNIFICANT CHANGE UP (ref 3.8–10.5)

## 2020-01-01 PROCEDURE — 84478 ASSAY OF TRIGLYCERIDES: CPT

## 2020-01-01 PROCEDURE — 82330 ASSAY OF CALCIUM: CPT

## 2020-01-01 PROCEDURE — 84132 ASSAY OF SERUM POTASSIUM: CPT

## 2020-01-01 PROCEDURE — 82565 ASSAY OF CREATININE: CPT

## 2020-01-01 PROCEDURE — 71045 X-RAY EXAM CHEST 1 VIEW: CPT | Mod: 26

## 2020-01-01 PROCEDURE — 71045 X-RAY EXAM CHEST 1 VIEW: CPT | Mod: 26,77

## 2020-01-01 PROCEDURE — 99223 1ST HOSP IP/OBS HIGH 75: CPT

## 2020-01-01 PROCEDURE — 86140 C-REACTIVE PROTEIN: CPT

## 2020-01-01 PROCEDURE — 83036 HEMOGLOBIN GLYCOSYLATED A1C: CPT

## 2020-01-01 PROCEDURE — 93010 ELECTROCARDIOGRAM REPORT: CPT

## 2020-01-01 PROCEDURE — 71045 X-RAY EXAM CHEST 1 VIEW: CPT | Mod: 26,77,76

## 2020-01-01 PROCEDURE — 99285 EMERGENCY DEPT VISIT HI MDM: CPT | Mod: 25

## 2020-01-01 PROCEDURE — 92960 CARDIOVERSION ELECTRIC EXT: CPT

## 2020-01-01 PROCEDURE — 83615 LACTATE (LD) (LDH) ENZYME: CPT

## 2020-01-01 PROCEDURE — 80076 HEPATIC FUNCTION PANEL: CPT

## 2020-01-01 PROCEDURE — 80053 COMPREHEN METABOLIC PANEL: CPT

## 2020-01-01 PROCEDURE — 99233 SBSQ HOSP IP/OBS HIGH 50: CPT

## 2020-01-01 PROCEDURE — 85018 HEMOGLOBIN: CPT

## 2020-01-01 PROCEDURE — 87637 SARSCOV2&INF A&B&RSV AMP PRB: CPT

## 2020-01-01 PROCEDURE — 94002 VENT MGMT INPAT INIT DAY: CPT

## 2020-01-01 PROCEDURE — 85730 THROMBOPLASTIN TIME PARTIAL: CPT

## 2020-01-01 PROCEDURE — 85379 FIBRIN DEGRADATION QUANT: CPT

## 2020-01-01 PROCEDURE — 94660 CPAP INITIATION&MGMT: CPT

## 2020-01-01 PROCEDURE — 86803 HEPATITIS C AB TEST: CPT

## 2020-01-01 PROCEDURE — 99221 1ST HOSP IP/OBS SF/LOW 40: CPT

## 2020-01-01 PROCEDURE — 93970 EXTREMITY STUDY: CPT | Mod: 26

## 2020-01-01 PROCEDURE — 84295 ASSAY OF SERUM SODIUM: CPT

## 2020-01-01 PROCEDURE — 87070 CULTURE OTHR SPECIMN AEROBIC: CPT

## 2020-01-01 PROCEDURE — 84145 PROCALCITONIN (PCT): CPT

## 2020-01-01 PROCEDURE — 84100 ASSAY OF PHOSPHORUS: CPT

## 2020-01-01 PROCEDURE — 82728 ASSAY OF FERRITIN: CPT

## 2020-01-01 PROCEDURE — 94003 VENT MGMT INPAT SUBQ DAY: CPT

## 2020-01-01 PROCEDURE — 87040 BLOOD CULTURE FOR BACTERIA: CPT

## 2020-01-01 PROCEDURE — 99284 EMERGENCY DEPT VISIT MOD MDM: CPT

## 2020-01-01 PROCEDURE — 87150 DNA/RNA AMPLIFIED PROBE: CPT

## 2020-01-01 PROCEDURE — 86769 SARS-COV-2 COVID-19 ANTIBODY: CPT

## 2020-01-01 PROCEDURE — T1013: CPT

## 2020-01-01 PROCEDURE — 82962 GLUCOSE BLOOD TEST: CPT

## 2020-01-01 PROCEDURE — 83605 ASSAY OF LACTIC ACID: CPT

## 2020-01-01 PROCEDURE — 85610 PROTHROMBIN TIME: CPT

## 2020-01-01 PROCEDURE — 85025 COMPLETE CBC W/AUTO DIFF WBC: CPT

## 2020-01-01 PROCEDURE — 82947 ASSAY GLUCOSE BLOOD QUANT: CPT

## 2020-01-01 PROCEDURE — 82803 BLOOD GASES ANY COMBINATION: CPT

## 2020-01-01 PROCEDURE — 93005 ELECTROCARDIOGRAM TRACING: CPT

## 2020-01-01 PROCEDURE — 87186 SC STD MICRODIL/AGAR DIL: CPT

## 2020-01-01 PROCEDURE — 82435 ASSAY OF BLOOD CHLORIDE: CPT

## 2020-01-01 PROCEDURE — 71045 X-RAY EXAM CHEST 1 VIEW: CPT

## 2020-01-01 PROCEDURE — 85027 COMPLETE CBC AUTOMATED: CPT

## 2020-01-01 PROCEDURE — 80048 BASIC METABOLIC PNL TOTAL CA: CPT

## 2020-01-01 PROCEDURE — 85014 HEMATOCRIT: CPT

## 2020-01-01 PROCEDURE — 99291 CRITICAL CARE FIRST HOUR: CPT

## 2020-01-01 PROCEDURE — 80202 ASSAY OF VANCOMYCIN: CPT

## 2020-01-01 PROCEDURE — 36415 COLL VENOUS BLD VENIPUNCTURE: CPT

## 2020-01-01 PROCEDURE — 87449 NOS EACH ORGANISM AG IA: CPT

## 2020-01-01 PROCEDURE — 80061 LIPID PANEL: CPT

## 2020-01-01 PROCEDURE — 93970 EXTREMITY STUDY: CPT

## 2020-01-01 PROCEDURE — 83735 ASSAY OF MAGNESIUM: CPT

## 2020-01-01 PROCEDURE — 36556 INSERT NON-TUNNEL CV CATH: CPT | Mod: LT

## 2020-01-01 RX ORDER — HYDRALAZINE HCL 50 MG
5 TABLET ORAL ONCE
Refills: 0 | Status: COMPLETED | OUTPATIENT
Start: 2020-01-01 | End: 2020-01-01

## 2020-01-01 RX ORDER — HEPARIN SODIUM 5000 [USP'U]/ML
3000 INJECTION INTRAVENOUS; SUBCUTANEOUS EVERY 6 HOURS
Refills: 0 | Status: DISCONTINUED | OUTPATIENT
Start: 2020-01-01 | End: 2020-01-01

## 2020-01-01 RX ORDER — HEPARIN SODIUM 5000 [USP'U]/ML
6500 INJECTION INTRAVENOUS; SUBCUTANEOUS EVERY 6 HOURS
Refills: 0 | Status: DISCONTINUED | OUTPATIENT
Start: 2020-01-01 | End: 2020-01-01

## 2020-01-01 RX ORDER — AMLODIPINE BESYLATE 2.5 MG/1
10 TABLET ORAL DAILY
Refills: 0 | Status: DISCONTINUED | OUTPATIENT
Start: 2020-01-01 | End: 2020-01-01

## 2020-01-01 RX ORDER — SODIUM BICARBONATE 1 MEQ/ML
50 SYRINGE (ML) INTRAVENOUS
Refills: 0 | Status: COMPLETED | OUTPATIENT
Start: 2020-01-01 | End: 2020-01-01

## 2020-01-01 RX ORDER — INSULIN LISPRO 100/ML
VIAL (ML) SUBCUTANEOUS EVERY 6 HOURS
Refills: 0 | Status: DISCONTINUED | OUTPATIENT
Start: 2020-01-01 | End: 2020-01-01

## 2020-01-01 RX ORDER — CISATRACURIUM BESYLATE 2 MG/ML
3 INJECTION INTRAVENOUS
Qty: 200 | Refills: 0 | Status: DISCONTINUED | OUTPATIENT
Start: 2020-01-01 | End: 2020-01-01

## 2020-01-01 RX ORDER — DEXAMETHASONE 0.5 MG/5ML
6 ELIXIR ORAL DAILY
Refills: 0 | Status: DISCONTINUED | OUTPATIENT
Start: 2020-01-01 | End: 2020-01-01

## 2020-01-01 RX ORDER — PANTOPRAZOLE SODIUM 20 MG/1
40 TABLET, DELAYED RELEASE ORAL DAILY
Refills: 0 | Status: DISCONTINUED | OUTPATIENT
Start: 2020-01-01 | End: 2020-01-01

## 2020-01-01 RX ORDER — HEPARIN SODIUM 5000 [USP'U]/ML
INJECTION INTRAVENOUS; SUBCUTANEOUS
Qty: 25000 | Refills: 0 | Status: DISCONTINUED | OUTPATIENT
Start: 2020-01-01 | End: 2020-01-01

## 2020-01-01 RX ORDER — DEXAMETHASONE 0.5 MG/5ML
20 ELIXIR ORAL DAILY
Refills: 0 | Status: COMPLETED | OUTPATIENT
Start: 2020-01-01 | End: 2020-01-01

## 2020-01-01 RX ORDER — HEPARIN SODIUM 5000 [USP'U]/ML
6500 INJECTION INTRAVENOUS; SUBCUTANEOUS ONCE
Refills: 0 | Status: COMPLETED | OUTPATIENT
Start: 2020-01-01 | End: 2020-01-01

## 2020-01-01 RX ORDER — VASOPRESSIN 20 [USP'U]/ML
0.04 INJECTION INTRAVENOUS
Qty: 50 | Refills: 0 | Status: DISCONTINUED | OUTPATIENT
Start: 2020-01-01 | End: 2020-01-01

## 2020-01-01 RX ORDER — GLUCAGON INJECTION, SOLUTION 0.5 MG/.1ML
1 INJECTION, SOLUTION SUBCUTANEOUS ONCE
Refills: 0 | Status: DISCONTINUED | OUTPATIENT
Start: 2020-01-01 | End: 2020-01-01

## 2020-01-01 RX ORDER — SODIUM CHLORIDE 9 MG/ML
1000 INJECTION, SOLUTION INTRAVENOUS ONCE
Refills: 0 | Status: COMPLETED | OUTPATIENT
Start: 2020-01-01 | End: 2020-01-01

## 2020-01-01 RX ORDER — INSULIN HUMAN 100 [IU]/ML
6 INJECTION, SOLUTION SUBCUTANEOUS
Qty: 100 | Refills: 0 | Status: DISCONTINUED | OUTPATIENT
Start: 2020-01-01 | End: 2020-01-01

## 2020-01-01 RX ORDER — ROCURONIUM BROMIDE 10 MG/ML
50 VIAL (ML) INTRAVENOUS ONCE
Refills: 0 | Status: COMPLETED | OUTPATIENT
Start: 2020-01-01 | End: 2020-01-01

## 2020-01-01 RX ORDER — NOREPINEPHRINE BITARTRATE/D5W 8 MG/250ML
0.05 PLASTIC BAG, INJECTION (ML) INTRAVENOUS
Qty: 8 | Refills: 0 | Status: DISCONTINUED | OUTPATIENT
Start: 2020-01-01 | End: 2020-01-01

## 2020-01-01 RX ORDER — ALBUTEROL 90 UG/1
2 AEROSOL, METERED ORAL EVERY 6 HOURS
Refills: 0 | Status: DISCONTINUED | OUTPATIENT
Start: 2020-01-01 | End: 2020-01-01

## 2020-01-01 RX ORDER — THIAMINE MONONITRATE (VIT B1) 100 MG
100 TABLET ORAL DAILY
Refills: 0 | Status: DISCONTINUED | OUTPATIENT
Start: 2020-01-01 | End: 2020-01-01

## 2020-01-01 RX ORDER — HEPARIN SODIUM 5000 [USP'U]/ML
300 INJECTION INTRAVENOUS; SUBCUTANEOUS
Qty: 10000 | Refills: 0 | Status: DISCONTINUED | OUTPATIENT
Start: 2020-01-01 | End: 2020-01-01

## 2020-01-01 RX ORDER — MIDAZOLAM HYDROCHLORIDE 1 MG/ML
0.02 INJECTION, SOLUTION INTRAMUSCULAR; INTRAVENOUS
Qty: 100 | Refills: 0 | Status: DISCONTINUED | OUTPATIENT
Start: 2020-01-01 | End: 2020-01-01

## 2020-01-01 RX ORDER — CHLORHEXIDINE GLUCONATE 213 G/1000ML
1 SOLUTION TOPICAL
Refills: 0 | Status: DISCONTINUED | OUTPATIENT
Start: 2020-01-01 | End: 2020-01-01

## 2020-01-01 RX ORDER — PHENYLEPHRINE HYDROCHLORIDE 10 MG/ML
3 INJECTION INTRAVENOUS
Qty: 40 | Refills: 0 | Status: DISCONTINUED | OUTPATIENT
Start: 2020-01-01 | End: 2020-01-01

## 2020-01-01 RX ORDER — PANTOPRAZOLE SODIUM 20 MG/1
40 TABLET, DELAYED RELEASE ORAL
Refills: 0 | Status: DISCONTINUED | OUTPATIENT
Start: 2020-01-01 | End: 2020-01-01

## 2020-01-01 RX ORDER — FENTANYL CITRATE 50 UG/ML
50 INJECTION INTRAVENOUS EVERY 4 HOURS
Refills: 0 | Status: DISCONTINUED | OUTPATIENT
Start: 2020-01-01 | End: 2020-01-01

## 2020-01-01 RX ORDER — SODIUM CHLORIDE 9 MG/ML
1000 INJECTION, SOLUTION INTRAVENOUS
Refills: 0 | Status: DISCONTINUED | OUTPATIENT
Start: 2020-01-01 | End: 2020-01-01

## 2020-01-01 RX ORDER — ROCURONIUM BROMIDE 10 MG/ML
100 VIAL (ML) INTRAVENOUS ONCE
Refills: 0 | Status: COMPLETED | OUTPATIENT
Start: 2020-01-01 | End: 2020-01-01

## 2020-01-01 RX ORDER — ENOXAPARIN SODIUM 100 MG/ML
80 INJECTION SUBCUTANEOUS EVERY 12 HOURS
Refills: 0 | Status: DISCONTINUED | OUTPATIENT
Start: 2020-01-01 | End: 2020-01-01

## 2020-01-01 RX ORDER — ENOXAPARIN SODIUM 100 MG/ML
40 INJECTION SUBCUTANEOUS DAILY
Refills: 0 | Status: DISCONTINUED | OUTPATIENT
Start: 2020-01-01 | End: 2020-01-01

## 2020-01-01 RX ORDER — CHLORHEXIDINE GLUCONATE 213 G/1000ML
15 SOLUTION TOPICAL EVERY 12 HOURS
Refills: 0 | Status: DISCONTINUED | OUTPATIENT
Start: 2020-01-01 | End: 2020-01-01

## 2020-01-01 RX ORDER — ASCORBIC ACID 60 MG
1000 TABLET,CHEWABLE ORAL DAILY
Refills: 0 | Status: DISCONTINUED | OUTPATIENT
Start: 2020-01-01 | End: 2020-01-01

## 2020-01-01 RX ORDER — MORPHINE SULFATE 50 MG/1
1 CAPSULE, EXTENDED RELEASE ORAL ONCE
Refills: 0 | Status: DISCONTINUED | OUTPATIENT
Start: 2020-01-01 | End: 2020-01-01

## 2020-01-01 RX ORDER — DEXTROSE 50 % IN WATER 50 %
12.5 SYRINGE (ML) INTRAVENOUS ONCE
Refills: 0 | Status: DISCONTINUED | OUTPATIENT
Start: 2020-01-01 | End: 2020-01-01

## 2020-01-01 RX ORDER — SODIUM BICARBONATE 1 MEQ/ML
150 SYRINGE (ML) INTRAVENOUS ONCE
Refills: 0 | Status: COMPLETED | OUTPATIENT
Start: 2020-01-01 | End: 2020-01-01

## 2020-01-01 RX ORDER — ACETAMINOPHEN 500 MG
1000 TABLET ORAL ONCE
Refills: 0 | Status: COMPLETED | OUTPATIENT
Start: 2020-01-01 | End: 2020-01-01

## 2020-01-01 RX ORDER — INSULIN GLARGINE 100 [IU]/ML
15 INJECTION, SOLUTION SUBCUTANEOUS AT BEDTIME
Refills: 0 | Status: DISCONTINUED | OUTPATIENT
Start: 2020-01-01 | End: 2020-01-01

## 2020-01-01 RX ORDER — ZINC SULFATE TAB 220 MG (50 MG ZINC EQUIVALENT) 220 (50 ZN) MG
220 TAB ORAL DAILY
Refills: 0 | Status: DISCONTINUED | OUTPATIENT
Start: 2020-01-01 | End: 2020-01-01

## 2020-01-01 RX ORDER — DEXTROSE 50 % IN WATER 50 %
15 SYRINGE (ML) INTRAVENOUS ONCE
Refills: 0 | Status: DISCONTINUED | OUTPATIENT
Start: 2020-01-01 | End: 2020-01-01

## 2020-01-01 RX ORDER — INSULIN GLARGINE 100 [IU]/ML
10 INJECTION, SOLUTION SUBCUTANEOUS AT BEDTIME
Refills: 0 | Status: DISCONTINUED | OUTPATIENT
Start: 2020-01-01 | End: 2020-01-01

## 2020-01-01 RX ORDER — AMLODIPINE BESYLATE 2.5 MG/1
1 TABLET ORAL
Qty: 0 | Refills: 0 | DISCHARGE

## 2020-01-01 RX ORDER — ENOXAPARIN SODIUM 100 MG/ML
80 INJECTION SUBCUTANEOUS
Refills: 0 | Status: DISCONTINUED | OUTPATIENT
Start: 2020-01-01 | End: 2020-01-01

## 2020-01-01 RX ORDER — POTASSIUM PHOSPHATE, MONOBASIC POTASSIUM PHOSPHATE, DIBASIC 236; 224 MG/ML; MG/ML
15 INJECTION, SOLUTION INTRAVENOUS ONCE
Refills: 0 | Status: COMPLETED | OUTPATIENT
Start: 2020-01-01 | End: 2020-01-01

## 2020-01-01 RX ORDER — INSULIN LISPRO 100/ML
VIAL (ML) SUBCUTANEOUS
Refills: 0 | Status: DISCONTINUED | OUTPATIENT
Start: 2020-01-01 | End: 2020-01-01

## 2020-01-01 RX ORDER — LOSARTAN POTASSIUM 100 MG/1
100 TABLET, FILM COATED ORAL DAILY
Refills: 0 | Status: DISCONTINUED | OUTPATIENT
Start: 2020-01-01 | End: 2020-01-01

## 2020-01-01 RX ORDER — AMIODARONE HYDROCHLORIDE 400 MG/1
1 TABLET ORAL
Qty: 900 | Refills: 0 | Status: DISCONTINUED | OUTPATIENT
Start: 2020-01-01 | End: 2020-01-01

## 2020-01-01 RX ORDER — OLMESARTAN MEDOXOMIL 5 MG/1
1 TABLET, FILM COATED ORAL
Qty: 0 | Refills: 0 | DISCHARGE

## 2020-01-01 RX ORDER — INSULIN LISPRO 100/ML
4 VIAL (ML) SUBCUTANEOUS ONCE
Refills: 0 | Status: COMPLETED | OUTPATIENT
Start: 2020-01-01 | End: 2020-01-01

## 2020-01-01 RX ORDER — DEXTROSE 50 % IN WATER 50 %
25 SYRINGE (ML) INTRAVENOUS ONCE
Refills: 0 | Status: DISCONTINUED | OUTPATIENT
Start: 2020-01-01 | End: 2020-01-01

## 2020-01-01 RX ORDER — SODIUM CHLORIDE 9 MG/ML
500 INJECTION, SOLUTION INTRAVENOUS
Refills: 0 | Status: COMPLETED | OUTPATIENT
Start: 2020-01-01 | End: 2020-01-01

## 2020-01-01 RX ORDER — REMDESIVIR 5 MG/ML
100 INJECTION INTRAVENOUS EVERY 24 HOURS
Refills: 0 | Status: COMPLETED | OUTPATIENT
Start: 2020-01-01 | End: 2020-01-01

## 2020-01-01 RX ORDER — PIPERACILLIN AND TAZOBACTAM 4; .5 G/20ML; G/20ML
3.38 INJECTION, POWDER, LYOPHILIZED, FOR SOLUTION INTRAVENOUS EVERY 12 HOURS
Refills: 0 | Status: DISCONTINUED | OUTPATIENT
Start: 2020-01-01 | End: 2020-01-01

## 2020-01-01 RX ORDER — APIXABAN 2.5 MG/1
10 TABLET, FILM COATED ORAL EVERY 12 HOURS
Refills: 0 | Status: DISCONTINUED | OUTPATIENT
Start: 2020-01-01 | End: 2020-01-01

## 2020-01-01 RX ORDER — PROPOFOL 10 MG/ML
50 INJECTION, EMULSION INTRAVENOUS
Qty: 1000 | Refills: 0 | Status: DISCONTINUED | OUTPATIENT
Start: 2020-01-01 | End: 2020-01-01

## 2020-01-01 RX ORDER — VANCOMYCIN HCL 1 G
1000 VIAL (EA) INTRAVENOUS ONCE
Refills: 0 | Status: COMPLETED | OUTPATIENT
Start: 2020-01-01 | End: 2020-01-01

## 2020-01-01 RX ORDER — PROPOFOL 10 MG/ML
40 INJECTION, EMULSION INTRAVENOUS
Qty: 1000 | Refills: 0 | Status: DISCONTINUED | OUTPATIENT
Start: 2020-01-01 | End: 2020-01-01

## 2020-01-01 RX ORDER — REMDESIVIR 5 MG/ML
200 INJECTION INTRAVENOUS EVERY 24 HOURS
Refills: 0 | Status: COMPLETED | OUTPATIENT
Start: 2020-01-01 | End: 2020-01-01

## 2020-01-01 RX ORDER — SODIUM BICARBONATE 1 MEQ/ML
100 SYRINGE (ML) INTRAVENOUS ONCE
Refills: 0 | Status: COMPLETED | OUTPATIENT
Start: 2020-01-01 | End: 2020-01-01

## 2020-01-01 RX ORDER — MIDAZOLAM HYDROCHLORIDE 1 MG/ML
0.1 INJECTION, SOLUTION INTRAMUSCULAR; INTRAVENOUS
Qty: 100 | Refills: 0 | Status: DISCONTINUED | OUTPATIENT
Start: 2020-01-01 | End: 2020-01-01

## 2020-01-01 RX ORDER — FENTANYL CITRATE 50 UG/ML
3 INJECTION INTRAVENOUS
Qty: 2500 | Refills: 0 | Status: DISCONTINUED | OUTPATIENT
Start: 2020-01-01 | End: 2020-01-01

## 2020-01-01 RX ORDER — SODIUM CHLORIDE 9 MG/ML
500 INJECTION, SOLUTION INTRAVENOUS
Refills: 0 | Status: DISCONTINUED | OUTPATIENT
Start: 2020-01-01 | End: 2020-01-01

## 2020-01-01 RX ORDER — INSULIN HUMAN 100 [IU]/ML
3 INJECTION, SOLUTION SUBCUTANEOUS
Qty: 100 | Refills: 0 | Status: DISCONTINUED | OUTPATIENT
Start: 2020-01-01 | End: 2020-01-01

## 2020-01-01 RX ORDER — SODIUM BICARBONATE 1 MEQ/ML
0.14 SYRINGE (ML) INTRAVENOUS
Qty: 150 | Refills: 0 | Status: DISCONTINUED | OUTPATIENT
Start: 2020-01-01 | End: 2020-01-01

## 2020-01-01 RX ORDER — ACETAMINOPHEN 500 MG
650 TABLET ORAL EVERY 6 HOURS
Refills: 0 | Status: DISCONTINUED | OUTPATIENT
Start: 2020-01-01 | End: 2020-01-01

## 2020-01-01 RX ORDER — PIPERACILLIN AND TAZOBACTAM 4; .5 G/20ML; G/20ML
3.38 INJECTION, POWDER, LYOPHILIZED, FOR SOLUTION INTRAVENOUS ONCE
Refills: 0 | Status: COMPLETED | OUTPATIENT
Start: 2020-01-01 | End: 2020-01-01

## 2020-01-01 RX ORDER — FENTANYL CITRATE 50 UG/ML
0.5 INJECTION INTRAVENOUS
Qty: 2500 | Refills: 0 | Status: DISCONTINUED | OUTPATIENT
Start: 2020-01-01 | End: 2020-01-01

## 2020-01-01 RX ORDER — REMDESIVIR 5 MG/ML
INJECTION INTRAVENOUS
Refills: 0 | Status: COMPLETED | OUTPATIENT
Start: 2020-01-01 | End: 2020-01-01

## 2020-01-01 RX ORDER — AMIODARONE HYDROCHLORIDE 400 MG/1
150 TABLET ORAL ONCE
Refills: 0 | Status: COMPLETED | OUTPATIENT
Start: 2020-01-01 | End: 2020-01-01

## 2020-01-01 RX ORDER — ENOXAPARIN SODIUM 100 MG/ML
40 INJECTION SUBCUTANEOUS EVERY 12 HOURS
Refills: 0 | Status: DISCONTINUED | OUTPATIENT
Start: 2020-01-01 | End: 2020-01-01

## 2020-01-01 RX ADMIN — Medication 1 DROP(S): at 06:02

## 2020-01-01 RX ADMIN — Medication 650 MILLIGRAM(S): at 06:30

## 2020-01-01 RX ADMIN — FENTANYL CITRATE 50 MICROGRAM(S): 50 INJECTION INTRAVENOUS at 01:51

## 2020-01-01 RX ADMIN — Medication 150 MILLIEQUIVALENT(S): at 18:46

## 2020-01-01 RX ADMIN — PANTOPRAZOLE SODIUM 40 MILLIGRAM(S): 20 TABLET, DELAYED RELEASE ORAL at 12:19

## 2020-01-01 RX ADMIN — Medication 650 MILLIGRAM(S): at 06:57

## 2020-01-01 RX ADMIN — Medication 650 MILLIGRAM(S): at 22:03

## 2020-01-01 RX ADMIN — HEPARIN SODIUM 3000 UNIT(S): 5000 INJECTION INTRAVENOUS; SUBCUTANEOUS at 01:16

## 2020-01-01 RX ADMIN — Medication 1: at 06:43

## 2020-01-01 RX ADMIN — HEPARIN SODIUM 1900 UNIT(S)/HR: 5000 INJECTION INTRAVENOUS; SUBCUTANEOUS at 11:12

## 2020-01-01 RX ADMIN — PIPERACILLIN AND TAZOBACTAM 25 GRAM(S): 4; .5 INJECTION, POWDER, LYOPHILIZED, FOR SOLUTION INTRAVENOUS at 22:00

## 2020-01-01 RX ADMIN — MIDAZOLAM HYDROCHLORIDE 8.19 MG/KG/HR: 1 INJECTION, SOLUTION INTRAMUSCULAR; INTRAVENOUS at 14:47

## 2020-01-01 RX ADMIN — MORPHINE SULFATE 1 MILLIGRAM(S): 50 CAPSULE, EXTENDED RELEASE ORAL at 01:40

## 2020-01-01 RX ADMIN — Medication 1: at 18:10

## 2020-01-01 RX ADMIN — Medication 7.68 MICROGRAM(S)/KG/MIN: at 14:42

## 2020-01-01 RX ADMIN — Medication 650 MILLIGRAM(S): at 06:31

## 2020-01-01 RX ADMIN — ENOXAPARIN SODIUM 40 MILLIGRAM(S): 100 INJECTION SUBCUTANEOUS at 12:15

## 2020-01-01 RX ADMIN — MIDAZOLAM HYDROCHLORIDE 8.19 MG/KG/HR: 1 INJECTION, SOLUTION INTRAMUSCULAR; INTRAVENOUS at 14:42

## 2020-01-01 RX ADMIN — CHLORHEXIDINE GLUCONATE 1 APPLICATION(S): 213 SOLUTION TOPICAL at 06:02

## 2020-01-01 RX ADMIN — AMLODIPINE BESYLATE 10 MILLIGRAM(S): 2.5 TABLET ORAL at 05:49

## 2020-01-01 RX ADMIN — INSULIN GLARGINE 10 UNIT(S): 100 INJECTION, SOLUTION SUBCUTANEOUS at 22:52

## 2020-01-01 RX ADMIN — CHLORHEXIDINE GLUCONATE 15 MILLILITER(S): 213 SOLUTION TOPICAL at 05:42

## 2020-01-01 RX ADMIN — ENOXAPARIN SODIUM 80 MILLIGRAM(S): 100 INJECTION SUBCUTANEOUS at 05:47

## 2020-01-01 RX ADMIN — Medication 4: at 11:28

## 2020-01-01 RX ADMIN — ENOXAPARIN SODIUM 40 MILLIGRAM(S): 100 INJECTION SUBCUTANEOUS at 12:45

## 2020-01-01 RX ADMIN — FENTANYL CITRATE 24.6 MICROGRAM(S)/KG/HR: 50 INJECTION INTRAVENOUS at 14:55

## 2020-01-01 RX ADMIN — Medication 6 MILLIGRAM(S): at 05:50

## 2020-01-01 RX ADMIN — Medication 110 MILLIGRAM(S): at 06:55

## 2020-01-01 RX ADMIN — MIDAZOLAM HYDROCHLORIDE 8.19 MG/KG/HR: 1 INJECTION, SOLUTION INTRAMUSCULAR; INTRAVENOUS at 20:19

## 2020-01-01 RX ADMIN — Medication 1000 MILLIGRAM(S): at 06:02

## 2020-01-01 RX ADMIN — APIXABAN 10 MILLIGRAM(S): 2.5 TABLET, FILM COATED ORAL at 17:07

## 2020-01-01 RX ADMIN — PHENYLEPHRINE HYDROCHLORIDE 92.1 MICROGRAM(S)/KG/MIN: 10 INJECTION INTRAVENOUS at 15:06

## 2020-01-01 RX ADMIN — Medication 7.68 MICROGRAM(S)/KG/MIN: at 00:25

## 2020-01-01 RX ADMIN — INSULIN GLARGINE 15 UNIT(S): 100 INJECTION, SOLUTION SUBCUTANEOUS at 21:50

## 2020-01-01 RX ADMIN — Medication 400 MILLIGRAM(S): at 23:29

## 2020-01-01 RX ADMIN — FENTANYL CITRATE 50 MICROGRAM(S): 50 INJECTION INTRAVENOUS at 02:24

## 2020-01-01 RX ADMIN — Medication 1 DROP(S): at 17:08

## 2020-01-01 RX ADMIN — Medication 100 MILLIGRAM(S): at 21:48

## 2020-01-01 RX ADMIN — ZINC SULFATE TAB 220 MG (50 MG ZINC EQUIVALENT) 220 MILLIGRAM(S): 220 (50 ZN) TAB at 05:43

## 2020-01-01 RX ADMIN — HEPARIN SODIUM 2300 UNIT(S)/HR: 5000 INJECTION INTRAVENOUS; SUBCUTANEOUS at 05:07

## 2020-01-01 RX ADMIN — CISATRACURIUM BESYLATE 14.7 MICROGRAM(S)/KG/MIN: 2 INJECTION INTRAVENOUS at 05:08

## 2020-01-01 RX ADMIN — Medication 1 DROP(S): at 18:16

## 2020-01-01 RX ADMIN — Medication 6: at 13:37

## 2020-01-01 RX ADMIN — Medication 650 MILLIGRAM(S): at 12:45

## 2020-01-01 RX ADMIN — PROPOFOL 24.6 MICROGRAM(S)/KG/MIN: 10 INJECTION, EMULSION INTRAVENOUS at 00:25

## 2020-01-01 RX ADMIN — Medication 50 MILLIEQUIVALENT(S): at 22:00

## 2020-01-01 RX ADMIN — Medication 50 MILLIGRAM(S): at 09:49

## 2020-01-01 RX ADMIN — FENTANYL CITRATE 24.6 MICROGRAM(S)/KG/HR: 50 INJECTION INTRAVENOUS at 09:05

## 2020-01-01 RX ADMIN — PROPOFOL 24.6 MICROGRAM(S)/KG/MIN: 10 INJECTION, EMULSION INTRAVENOUS at 06:42

## 2020-01-01 RX ADMIN — Medication 50 MILLIEQUIVALENT(S): at 21:10

## 2020-01-01 RX ADMIN — AMIODARONE HYDROCHLORIDE 618 MILLIGRAM(S): 400 TABLET ORAL at 23:30

## 2020-01-01 RX ADMIN — Medication 400 MILLIGRAM(S): at 16:45

## 2020-01-01 RX ADMIN — Medication 50 MILLIEQUIVALENT(S): at 21:05

## 2020-01-01 RX ADMIN — MORPHINE SULFATE 1 MILLIGRAM(S): 50 CAPSULE, EXTENDED RELEASE ORAL at 23:09

## 2020-01-01 RX ADMIN — MIDAZOLAM HYDROCHLORIDE 8.19 MG/KG/HR: 1 INJECTION, SOLUTION INTRAMUSCULAR; INTRAVENOUS at 05:07

## 2020-01-01 RX ADMIN — MIDAZOLAM HYDROCHLORIDE 1.64 MG/KG/HR: 1 INJECTION, SOLUTION INTRAMUSCULAR; INTRAVENOUS at 06:41

## 2020-01-01 RX ADMIN — Medication 110 MILLIGRAM(S): at 06:03

## 2020-01-01 RX ADMIN — Medication 100 MILLIGRAM(S): at 13:39

## 2020-01-01 RX ADMIN — Medication 650 MILLIGRAM(S): at 08:42

## 2020-01-01 RX ADMIN — Medication 100 MILLIGRAM(S): at 04:35

## 2020-01-01 RX ADMIN — Medication 2: at 12:43

## 2020-01-01 RX ADMIN — CISATRACURIUM BESYLATE 14.7 MICROGRAM(S)/KG/MIN: 2 INJECTION INTRAVENOUS at 12:29

## 2020-01-01 RX ADMIN — ENOXAPARIN SODIUM 80 MILLIGRAM(S): 100 INJECTION SUBCUTANEOUS at 18:14

## 2020-01-01 RX ADMIN — Medication 2: at 12:39

## 2020-01-01 RX ADMIN — Medication 7.68 MICROGRAM(S)/KG/MIN: at 07:39

## 2020-01-01 RX ADMIN — Medication 7.68 MICROGRAM(S)/KG/MIN: at 05:46

## 2020-01-01 RX ADMIN — Medication 2: at 09:11

## 2020-01-01 RX ADMIN — FENTANYL CITRATE 24.6 MICROGRAM(S)/KG/HR: 50 INJECTION INTRAVENOUS at 07:39

## 2020-01-01 RX ADMIN — CHLORHEXIDINE GLUCONATE 15 MILLILITER(S): 213 SOLUTION TOPICAL at 06:41

## 2020-01-01 RX ADMIN — Medication 1 DROP(S): at 06:53

## 2020-01-01 RX ADMIN — Medication 100 MILLIGRAM(S): at 05:50

## 2020-01-01 RX ADMIN — CHLORHEXIDINE GLUCONATE 1 APPLICATION(S): 213 SOLUTION TOPICAL at 06:42

## 2020-01-01 RX ADMIN — HEPARIN SODIUM 1500 UNIT(S)/HR: 5000 INJECTION INTRAVENOUS; SUBCUTANEOUS at 17:42

## 2020-01-01 RX ADMIN — FENTANYL CITRATE 50 MICROGRAM(S): 50 INJECTION INTRAVENOUS at 03:00

## 2020-01-01 RX ADMIN — Medication 50 MILLIGRAM(S): at 12:28

## 2020-01-01 RX ADMIN — HEPARIN SODIUM 3000 UNIT(S): 5000 INJECTION INTRAVENOUS; SUBCUTANEOUS at 11:16

## 2020-01-01 RX ADMIN — Medication 650 MILLIGRAM(S): at 16:30

## 2020-01-01 RX ADMIN — Medication 650 MILLIGRAM(S): at 08:37

## 2020-01-01 RX ADMIN — INSULIN HUMAN 6 UNIT(S)/HR: 100 INJECTION, SOLUTION SUBCUTANEOUS at 14:44

## 2020-01-01 RX ADMIN — Medication 1 DROP(S): at 05:41

## 2020-01-01 RX ADMIN — PHENYLEPHRINE HYDROCHLORIDE 92.1 MICROGRAM(S)/KG/MIN: 10 INJECTION INTRAVENOUS at 21:30

## 2020-01-01 RX ADMIN — PROPOFOL 24.6 MICROGRAM(S)/KG/MIN: 10 INJECTION, EMULSION INTRAVENOUS at 20:48

## 2020-01-01 RX ADMIN — HEPARIN SODIUM 6500 UNIT(S): 5000 INJECTION INTRAVENOUS; SUBCUTANEOUS at 17:40

## 2020-01-01 RX ADMIN — PHENYLEPHRINE HYDROCHLORIDE 92.1 MICROGRAM(S)/KG/MIN: 10 INJECTION INTRAVENOUS at 01:30

## 2020-01-01 RX ADMIN — FENTANYL CITRATE 50 MICROGRAM(S): 50 INJECTION INTRAVENOUS at 02:35

## 2020-01-01 RX ADMIN — Medication 6 MILLIGRAM(S): at 05:17

## 2020-01-01 RX ADMIN — Medication 650 MILLIGRAM(S): at 06:26

## 2020-01-01 RX ADMIN — Medication 6: at 23:57

## 2020-01-01 RX ADMIN — POTASSIUM PHOSPHATE, MONOBASIC POTASSIUM PHOSPHATE, DIBASIC 62.5 MILLIMOLE(S): 236; 224 INJECTION, SOLUTION INTRAVENOUS at 11:00

## 2020-01-01 RX ADMIN — Medication 1 DROP(S): at 05:05

## 2020-01-01 RX ADMIN — FENTANYL CITRATE 4.1 MICROGRAM(S)/KG/HR: 50 INJECTION INTRAVENOUS at 20:50

## 2020-01-01 RX ADMIN — PROPOFOL 19.7 MICROGRAM(S)/KG/MIN: 10 INJECTION, EMULSION INTRAVENOUS at 10:28

## 2020-01-01 RX ADMIN — MORPHINE SULFATE 1 MILLIGRAM(S): 50 CAPSULE, EXTENDED RELEASE ORAL at 05:53

## 2020-01-01 RX ADMIN — MORPHINE SULFATE 1 MILLIGRAM(S): 50 CAPSULE, EXTENDED RELEASE ORAL at 06:23

## 2020-01-01 RX ADMIN — Medication 650 MILLIGRAM(S): at 07:09

## 2020-01-01 RX ADMIN — PROPOFOL 19.7 MICROGRAM(S)/KG/MIN: 10 INJECTION, EMULSION INTRAVENOUS at 14:43

## 2020-01-01 RX ADMIN — REMDESIVIR 500 MILLIGRAM(S): 5 INJECTION INTRAVENOUS at 18:06

## 2020-01-01 RX ADMIN — Medication 50 MILLIEQUIVALENT(S): at 23:08

## 2020-01-01 RX ADMIN — CHLORHEXIDINE GLUCONATE 15 MILLILITER(S): 213 SOLUTION TOPICAL at 06:02

## 2020-01-01 RX ADMIN — Medication 6: at 17:03

## 2020-01-01 RX ADMIN — SODIUM CHLORIDE 1000 MILLILITER(S): 9 INJECTION, SOLUTION INTRAVENOUS at 19:35

## 2020-01-01 RX ADMIN — FENTANYL CITRATE 24.6 MICROGRAM(S)/KG/HR: 50 INJECTION INTRAVENOUS at 18:09

## 2020-01-01 RX ADMIN — INSULIN HUMAN 6 UNIT(S)/HR: 100 INJECTION, SOLUTION SUBCUTANEOUS at 14:47

## 2020-01-01 RX ADMIN — Medication 1000 MILLIGRAM(S): at 23:44

## 2020-01-01 RX ADMIN — HEPARIN SODIUM 1700 UNIT(S)/HR: 5000 INJECTION INTRAVENOUS; SUBCUTANEOUS at 01:15

## 2020-01-01 RX ADMIN — Medication 4: at 15:49

## 2020-01-01 RX ADMIN — Medication 6 MILLIGRAM(S): at 16:59

## 2020-01-01 RX ADMIN — Medication 100 MILLIGRAM(S): at 06:03

## 2020-01-01 RX ADMIN — Medication 6 MILLIGRAM(S): at 06:26

## 2020-01-01 RX ADMIN — Medication 5 MILLIGRAM(S): at 22:52

## 2020-01-01 RX ADMIN — CHLORHEXIDINE GLUCONATE 15 MILLILITER(S): 213 SOLUTION TOPICAL at 18:06

## 2020-01-01 RX ADMIN — Medication 110 MILLIGRAM(S): at 05:00

## 2020-01-01 RX ADMIN — Medication 2 MILLIGRAM(S): at 01:40

## 2020-01-01 RX ADMIN — HEPARIN SODIUM 3000 UNIT(S): 5000 INJECTION INTRAVENOUS; SUBCUTANEOUS at 05:08

## 2020-01-01 RX ADMIN — AMLODIPINE BESYLATE 10 MILLIGRAM(S): 2.5 TABLET ORAL at 06:26

## 2020-01-01 RX ADMIN — VASOPRESSIN 2.4 UNIT(S)/MIN: 20 INJECTION INTRAVENOUS at 04:30

## 2020-01-01 RX ADMIN — REMDESIVIR 500 MILLIGRAM(S): 5 INJECTION INTRAVENOUS at 16:59

## 2020-01-01 RX ADMIN — HEPARIN SODIUM 1700 UNIT(S)/HR: 5000 INJECTION INTRAVENOUS; SUBCUTANEOUS at 05:08

## 2020-01-01 RX ADMIN — PHENYLEPHRINE HYDROCHLORIDE 92.1 MICROGRAM(S)/KG/MIN: 10 INJECTION INTRAVENOUS at 23:30

## 2020-01-01 RX ADMIN — Medication 6: at 11:34

## 2020-01-01 RX ADMIN — Medication 1: at 00:48

## 2020-01-01 RX ADMIN — PHENYLEPHRINE HYDROCHLORIDE 92.1 MICROGRAM(S)/KG/MIN: 10 INJECTION INTRAVENOUS at 03:30

## 2020-01-01 RX ADMIN — MIDAZOLAM HYDROCHLORIDE 8.19 MG/KG/HR: 1 INJECTION, SOLUTION INTRAMUSCULAR; INTRAVENOUS at 08:00

## 2020-01-01 RX ADMIN — CHLORHEXIDINE GLUCONATE 15 MILLILITER(S): 213 SOLUTION TOPICAL at 06:17

## 2020-01-01 RX ADMIN — Medication 2: at 11:24

## 2020-01-01 RX ADMIN — PIPERACILLIN AND TAZOBACTAM 200 GRAM(S): 4; .5 INJECTION, POWDER, LYOPHILIZED, FOR SOLUTION INTRAVENOUS at 17:23

## 2020-01-01 RX ADMIN — REMDESIVIR 500 MILLIGRAM(S): 5 INJECTION INTRAVENOUS at 15:50

## 2020-01-01 RX ADMIN — REMDESIVIR 500 MILLIGRAM(S): 5 INJECTION INTRAVENOUS at 17:08

## 2020-01-01 RX ADMIN — FENTANYL CITRATE 4.1 MICROGRAM(S)/KG/HR: 50 INJECTION INTRAVENOUS at 05:46

## 2020-01-01 RX ADMIN — PHENYLEPHRINE HYDROCHLORIDE 92.1 MICROGRAM(S)/KG/MIN: 10 INJECTION INTRAVENOUS at 05:30

## 2020-01-01 RX ADMIN — Medication 50 MILLIEQUIVALENT(S): at 21:15

## 2020-01-01 RX ADMIN — Medication 1 DROP(S): at 17:06

## 2020-01-01 RX ADMIN — CHLORHEXIDINE GLUCONATE 15 MILLILITER(S): 213 SOLUTION TOPICAL at 17:01

## 2020-01-01 RX ADMIN — PROPOFOL 19.7 MICROGRAM(S)/KG/MIN: 10 INJECTION, EMULSION INTRAVENOUS at 18:10

## 2020-01-01 RX ADMIN — Medication 1 DROP(S): at 18:11

## 2020-01-01 RX ADMIN — Medication 50 MILLIEQUIVALENT(S): at 23:00

## 2020-01-01 RX ADMIN — Medication 1 DROP(S): at 06:42

## 2020-01-01 RX ADMIN — PROPOFOL 24.6 MICROGRAM(S)/KG/MIN: 10 INJECTION, EMULSION INTRAVENOUS at 02:25

## 2020-01-01 RX ADMIN — Medication 6: at 12:44

## 2020-01-01 RX ADMIN — Medication 100 MILLIGRAM(S): at 05:43

## 2020-01-01 RX ADMIN — MORPHINE SULFATE 1 MILLIGRAM(S): 50 CAPSULE, EXTENDED RELEASE ORAL at 23:39

## 2020-01-01 RX ADMIN — CHLORHEXIDINE GLUCONATE 1 APPLICATION(S): 213 SOLUTION TOPICAL at 06:17

## 2020-01-01 RX ADMIN — Medication 1: at 05:42

## 2020-01-01 RX ADMIN — Medication 100 MILLIGRAM(S): at 22:52

## 2020-01-01 RX ADMIN — Medication 110 MILLIGRAM(S): at 05:47

## 2020-01-01 RX ADMIN — PIPERACILLIN AND TAZOBACTAM 25 GRAM(S): 4; .5 INJECTION, POWDER, LYOPHILIZED, FOR SOLUTION INTRAVENOUS at 05:53

## 2020-01-01 RX ADMIN — Medication 1 DROP(S): at 18:15

## 2020-01-01 RX ADMIN — Medication 650 MILLIGRAM(S): at 23:00

## 2020-01-01 RX ADMIN — ENOXAPARIN SODIUM 40 MILLIGRAM(S): 100 INJECTION SUBCUTANEOUS at 12:30

## 2020-01-01 RX ADMIN — Medication 4: at 17:04

## 2020-01-01 RX ADMIN — CHLORHEXIDINE GLUCONATE 1 APPLICATION(S): 213 SOLUTION TOPICAL at 05:03

## 2020-01-01 RX ADMIN — Medication 2: at 07:44

## 2020-01-01 RX ADMIN — Medication 2: at 18:14

## 2020-01-01 RX ADMIN — Medication 4 UNIT(S): at 22:17

## 2020-01-01 RX ADMIN — LOSARTAN POTASSIUM 100 MILLIGRAM(S): 100 TABLET, FILM COATED ORAL at 05:50

## 2020-01-01 RX ADMIN — Medication 650 MILLIGRAM(S): at 06:00

## 2020-01-01 RX ADMIN — CHLORHEXIDINE GLUCONATE 1 APPLICATION(S): 213 SOLUTION TOPICAL at 05:42

## 2020-01-01 RX ADMIN — Medication 100 MILLIEQUIVALENT(S): at 18:46

## 2020-01-01 RX ADMIN — Medication 650 MILLIGRAM(S): at 09:07

## 2020-01-01 RX ADMIN — FENTANYL CITRATE 24.6 MICROGRAM(S)/KG/HR: 50 INJECTION INTRAVENOUS at 20:19

## 2020-01-01 RX ADMIN — Medication 110 MILLIGRAM(S): at 05:42

## 2020-01-01 RX ADMIN — Medication 1 DROP(S): at 05:55

## 2020-01-01 RX ADMIN — Medication 1000 MILLIGRAM(S): at 05:42

## 2020-01-01 RX ADMIN — FENTANYL CITRATE 24.6 MICROGRAM(S)/KG/HR: 50 INJECTION INTRAVENOUS at 06:14

## 2020-01-01 RX ADMIN — HEPARIN SODIUM 1500 UNIT(S)/HR: 5000 INJECTION INTRAVENOUS; SUBCUTANEOUS at 23:51

## 2020-01-01 RX ADMIN — AMIODARONE HYDROCHLORIDE 33.3 MG/MIN: 400 TABLET ORAL at 05:08

## 2020-01-01 RX ADMIN — SODIUM CHLORIDE 1000 MILLILITER(S): 9 INJECTION, SOLUTION INTRAVENOUS at 10:56

## 2020-01-01 RX ADMIN — Medication 75 MEQ/KG/HR: at 04:30

## 2020-01-01 RX ADMIN — MORPHINE SULFATE 1 MILLIGRAM(S): 50 CAPSULE, EXTENDED RELEASE ORAL at 02:35

## 2020-01-01 RX ADMIN — ENOXAPARIN SODIUM 80 MILLIGRAM(S): 100 INJECTION SUBCUTANEOUS at 06:55

## 2020-01-01 RX ADMIN — HEPARIN SODIUM 1500 UNIT(S)/HR: 5000 INJECTION INTRAVENOUS; SUBCUTANEOUS at 01:06

## 2020-01-01 RX ADMIN — Medication 250 MILLIGRAM(S): at 17:38

## 2020-01-01 RX ADMIN — PANTOPRAZOLE SODIUM 40 MILLIGRAM(S): 20 TABLET, DELAYED RELEASE ORAL at 09:49

## 2020-01-01 RX ADMIN — FENTANYL CITRATE 24.6 MICROGRAM(S)/KG/HR: 50 INJECTION INTRAVENOUS at 08:00

## 2020-01-01 RX ADMIN — PROPOFOL 19.7 MICROGRAM(S)/KG/MIN: 10 INJECTION, EMULSION INTRAVENOUS at 20:15

## 2020-01-01 RX ADMIN — REMDESIVIR 500 MILLIGRAM(S): 5 INJECTION INTRAVENOUS at 19:22

## 2020-01-01 RX ADMIN — CHLORHEXIDINE GLUCONATE 15 MILLILITER(S): 213 SOLUTION TOPICAL at 05:05

## 2020-01-01 RX ADMIN — CHLORHEXIDINE GLUCONATE 15 MILLILITER(S): 213 SOLUTION TOPICAL at 18:16

## 2020-01-01 RX ADMIN — Medication 8: at 06:03

## 2020-01-01 RX ADMIN — Medication 3: at 18:36

## 2020-01-01 RX ADMIN — ZINC SULFATE TAB 220 MG (50 MG ZINC EQUIVALENT) 220 MILLIGRAM(S): 220 (50 ZN) TAB at 06:03

## 2020-01-01 RX ADMIN — CHLORHEXIDINE GLUCONATE 15 MILLILITER(S): 213 SOLUTION TOPICAL at 18:10

## 2020-01-01 RX ADMIN — LOSARTAN POTASSIUM 100 MILLIGRAM(S): 100 TABLET, FILM COATED ORAL at 06:26

## 2020-01-01 RX ADMIN — Medication 1000 MILLIGRAM(S): at 21:48

## 2020-01-01 RX ADMIN — PROPOFOL 24.6 MICROGRAM(S)/KG/MIN: 10 INJECTION, EMULSION INTRAVENOUS at 04:35

## 2020-01-01 RX ADMIN — HEPARIN SODIUM 1500 UNIT(S)/HR: 5000 INJECTION INTRAVENOUS; SUBCUTANEOUS at 17:01

## 2020-12-05 NOTE — PATIENT PROFILE ADULT - PRO INTERPRETER NEED 2
Mongolian
89 yo female presents from OSH with L groin wound infection with involvement of fem-pop bypass graft s/p washout and excision of infected graft, seen for PT eval POD #2.

## 2020-12-05 NOTE — ED ADULT TRIAGE NOTE - CHIEF COMPLAINT QUOTE
Pt c/o SOB, fevers and cough since Sunday. As per son, pt with increased SOB and cough. Son checked pulse ox at home, read 60% on RA. Pt noted to be 57% O2 on room air in triage, brought back to critical care.

## 2020-12-05 NOTE — ED PROVIDER NOTE - PROGRESS NOTE DETAILS
Jj: I discussed the patient's case with his son Nayan in [place] Jj: I reviewed lab results so far. I rechecked the patient. Jj: I reviewed the patient's lab results so far. CBC WNL. K of 3.2. D-dimer elevated at 411, CRP 31.8, Ferritin 532, and procalcitonin 0.31. He is COVID+.  Patient will be admitted to the hospital. Jj: I discussed the patient's case with his son Nayan and updated him on plan for admission to the hospital. Jj: Dr. Roy discussed patient's case with Dr. García. Patient sleepin. With BIPAP on his o2 sat remains 97. HR 54.

## 2020-12-05 NOTE — ED PROVIDER NOTE - ATTENDING CONTRIBUTION TO CARE
59 yo with hx of HTN, HLD and diet controlled DM presents to ED acutely SOB with pulse ox 48%.  Pt with worsening SOB x last 3 days with increased cough and productive clear sputum.  + COVID contacts at home. On exam pt awake, acutely hypoxic, mm moist, Neck supple, Cor Reg, Lungs diffuse crackles b/l, abd soft, NT, BS+, Ext no edema, Neuro non-focal.  Pt initially placed on 100% NRB and 6L n/c with sat to 86-88% ans started on bi-pap.  CXR with diffuse b/l patchy infiltrates.  will start IV steroids and admit

## 2020-12-05 NOTE — H&P ADULT - HISTORY OF PRESENT ILLNESS
A 61 yo male w/ pmhx of HLD, HTN, DM presents to the ED w/ complaints of SOB and dyspnea. The patient lives at home w/ his wife and kids. The patient was recently around his children who were positive w/ covid, at home the patient noticed at rest he was having dyspnea w/o minimal exertion.          In the ED the patient was noted to be hypoxic, SPO2 57% on RA and RR was 34. The patient was placed on on Bi-pap and SPO2 improved to 98%. EKG performed showed evidence of Sinus bradycardia.

## 2020-12-05 NOTE — ED ADULT NURSE NOTE - OBJECTIVE STATEMENT
Chief complaint of flu-like symptoms. Pt states he checked his oxygen saturation at home and was found to be low 60's on RA. Pt brought to MD [facility] at bedside. Pt placed on BiPaP. +SOB, fever and chills. +Sick contacts. Pt placed on cm and . Airborne and contact precautions initiated. Pt alert and oriented x3, tachypneic, skin warm and dry, color appropriate for ethnicity, speech clear, moving extremities.

## 2020-12-05 NOTE — ED ADULT NURSE REASSESSMENT NOTE - NS ED NURSE REASSESS COMMENT FT1
Assumed pt care at this time. Pt Bulgarian speaking only, exam interpreted by Nancy. Pt denies current complaints. Bipap in place. Pt awaiting bed.

## 2020-12-05 NOTE — ED PROVIDER NOTE - OBJECTIVE STATEMENT
60 year old male who presents with flu like symptoms. On 11/29 the patient began to have fevers, cough, and shortness of breath. Since then the shortness of breath have gotten worse. At home when his son checked his pulse ox it read 60%. In triage he was 57% on RA. Patient alert. States 2 others at home have COVID. 60 year old male who presents with flu like symptoms. On 11/29 the patient began to have fevers, cough, and shortness of breath. Since then the shortness of breath have gotten worse. Also has had sore throat and headaches. Was scheduled to get an "injection" at home tomorrow but overnight was more short of breath and when his son checked his pulse ox it read 60% with RR 34. In triage he was 57% on RA. Patient alert. States 2 others at home have COVID. 60 year old male who presents with flu like symptoms. On 11/29 the patient began to have fevers, cough, and shortness of breath. Since then the shortness of breath have gotten worse. Also has had sore throat and headaches. Was scheduled to get an "injection" at home tomorrow but overnight was more short of breath and when his son checked his pulse ox it read 60% with RR 34. In triage he was 57% on RA. Patient alert. States 2 others at home have COVID. son abebe [phone] english speaking and aware of patient's medical hx/current condition.

## 2020-12-05 NOTE — ED ADULT NURSE REASSESSMENT NOTE - NS ED NURSE REASSESS COMMENT FT1
pt remains on Bipap at this time. pt NSR on monitor, 02 sat more stable. IV sites patent, medicated as ordered, bed assignment received but RN on floor still awaiting availability.

## 2020-12-05 NOTE — H&P ADULT - NSHPPHYSICALEXAM_GEN_ALL_CORE
Vital Signs Last 24 Hrs  T(C): 37.4 (05 Dec 2020 04:34), Max: 37.4 (05 Dec 2020 04:12)  T(F): 99.4 (05 Dec 2020 04:34), Max: 99.4 (05 Dec 2020 04:12)  HR: 57 (05 Dec 2020 04:34) (57 - 79)  BP: 173/89 (05 Dec 2020 04:34) (173/89 - 190/76)  RR: 26 (05 Dec 2020 04:34) (24 - 26)  SpO2: 100% (05 Dec 2020 04:34) (57% - 100%)

## 2020-12-05 NOTE — ED ADULT NURSE REASSESSMENT NOTE - NS ED NURSE REASSESS COMMENT FT1
pt easily arousable, even and unlabored resps present, remains on Bipap, unable to bring to floor due to no availability despite bed assignment. RT at bedside, trialed off Bipap again but failed, pt became hypoxic and unable to prone without coughing and tachypnea. pt placed back on Bipap and 02 at 80% Fi02.

## 2020-12-05 NOTE — H&P ADULT - RS GEN PE MLT RESP DETAILS PC
no rhonchi/no chest wall tenderness/no rales/good air movement/normal/respirations non-labored/airway patent

## 2020-12-05 NOTE — ED ADULT NURSE REASSESSMENT NOTE - NS ED NURSE REASSESS COMMENT FT1
pt with no complaints at this time, remains on Bipap with stable 02 sat. NSR and sinus rose on monitor with no distress. awaiting bed assignment, will monitor.

## 2020-12-05 NOTE — ED PROVIDER NOTE - NS ED ROS FT
Constitutional: +Fever  ENMT:  no nasal congestion/drainage  CV: no chest pain, no edema.  Resp: +cough, +dyspnea  GI: no abdominal pain, no diarrhea, no nausea and no vomiting.  MSK: no weakness  Skin: no lesions, and no rashes.  Neuro: no LOC Constitutional: +Fever, +loss of taste   ENMT:  no nasal congestion/drainage, +sore throat  CV: no chest pain, no edema.  Resp: +cough, +dyspnea  GI: no abdominal pain, no diarrhea, no nausea and no vomiting.  MSK: no weakness  Skin: no lesions, and no rashes.  Neuro: no LOC, +headaches

## 2020-12-05 NOTE — H&P ADULT - NSHPSOCIALHISTORY_GEN_ALL_CORE
Patient lives at home w/ his wife and kids.  Works in construction  Denies the use of alcohol, tobacco and illegal drug use.

## 2020-12-05 NOTE — ED ADULT NURSE REASSESSMENT NOTE - NS ED NURSE REASSESS COMMENT FT1
pt hypertensive, Dr Nevarez contacted regarding inability to give PO meds due to Bipap. instructed to trial off Bipap and wean off. upon attempt, pt immediately became short of breath, tachypneic, and hypoxic, with complaints of not being able to breathe. pt subsequently placed back on Bipap. Dr Nevarez contacted and aware. will medicate accordingly.

## 2020-12-05 NOTE — H&P ADULT - NSHPLABSRESULTS_GEN_ALL_CORE
13.9   8.63  )-----------( 218      ( 05 Dec 2020 05:00 )             40.6     05 Dec 2020 05:00    140    |  101    |  14.0   ----------------------------<  159    3.2     |  26.0   |  0.84     Ca    8.3        05 Dec 2020 05:00    TPro  7.2    /  Alb  3.7    /  TBili  0.5    /  DBili  x      /  AST  46     /  ALT  29     /  AlkPhos  116    05 Dec 2020 05:00    LIVER FUNCTIONS - ( 05 Dec 2020 05:00 )  Alb: 3.7 g/dL / Pro: 7.2 g/dL / ALK PHOS: 116 U/L / ALT: 29 U/L / AST: 46 U/L / GGT: x             CAPILLARY BLOOD GLUCOSE

## 2020-12-05 NOTE — ED ADULT NURSE REASSESSMENT NOTE - NS ED NURSE REASSESS COMMENT FT1
pt received in shift change report at this time, resting comfortably on BIPAP. pt with NSR on monitor, stable vitals, 02 sat stable at 98% as documented. pt offers no complaints at this time, awaiting admit orders and bed assignment. skin warm dry and intact. pt aware of plan of care.

## 2020-12-05 NOTE — ED PROVIDER NOTE - PHYSICAL EXAMINATION
Gen: alert, answering questions, no acute distress  Head: NC/AT  Neck: trachea midline  CV: RRR  Resp: crackles bilateral bases   Ext: no deformities  Neuro:  A&O appears non focal  Skin:  Warm and dry as visualized

## 2020-12-05 NOTE — ED PROVIDER NOTE - CLINICAL SUMMARY MEDICAL DECISION MAKING FREE TEXT BOX
60 year old male who presents with 7 days of loss of taste, cough, and shortness of breath. *** 60 year old male who presents with 7 days of loss of taste, cough, and shortness of breath. CMP with K of 3.2. Multiple inflammatory markers elevated - D-dimer 411, ferritin 532, procal 0.31, crp 31.8. Patient tested COVID+. Patient will be admitted to hospital for further care. 60 year old male who presents with 7 days of loss of taste, cough, and shortness of breath. CMP with K of 3.2. Multiple inflammatory markers elevated - D-dimer 411, ferritin 532, procal 0.31, crp 31.8. ABG 7.44/41/139/28. Patient tested COVID+. Patient will be admitted to hospital for further care.

## 2020-12-05 NOTE — H&P ADULT - ASSESSMENT
A 61 yo male w/ pmhx of HLD, DM presents to the ED w/ complaints of SOB and dyspnea. Patient will be admitted for Acute respiratory distress w/ evidence of hypoxia due to COVID- PNA          Acute respiratory distress w/ evidence of hypoxia   - Etiology due to COVID  - Currently on Bi-pap, down titrate as possible   - Repeat inflammatory markers q 48  - ID consult   - Albuterol inhaler PRN  - CXR pending     COVID-19  - Management as above     Elevated Blood pressure  - Currently on no medication  - continue to monitor    Diabetes Type 2  -HgA1c pending for  AM    HLD  - Lipid panel pending for am    Obesity  - encourage life style modification     Prophylactic measure   - Lovenox 40 mg QD A 59 yo male w/ pmhx of HLD, DM presents to the ED w/ complaints of SOB and dyspnea. Patient will be admitted for Acute respiratory distress w/ evidence of hypoxia due to COVID- PNA          Acute respiratory distress w/ evidence of hypoxia   - Etiology due to COVID  - Currently on Bi-pap, down titrate as possible   - Repeat inflammatory markers q 48  - ID consult   - Albuterol inhaler PRN  - decadron 6 mg QD  - CXR pending     COVID-19  - Management as above     Elevated Blood pressure  - Currently on no medication  - continue to monitor    Diabetes Type 2  -HgA1c pending for  AM    HLD  - Lipid panel pending for am    Obesity  - encourage life style modification     Prophylactic measure   - Lovenox 40 mg QD A 59 yo male w/ pmhx of HLD, DM presents to the ED w/ complaints of SOB and dyspnea. Patient will be admitted for Acute respiratory distress w/ evidence of hypoxia due to COVID- PNA          Acute respiratory distress w/ evidence of hypoxia   - Etiology due to COVID  - Currently on Bi-pap, down titrate as possible   - Repeat inflammatory markers q 48  - ID consult   - Albuterol inhaler PRN  - decadron 6 mg QD and remdesivir   - CXR pending     COVID-19  - Management as above     Elevated Blood pressure  - Currently on no medication  - continue to monitor    Diabetes Type 2  -HgA1c pending for  AM    HLD  - Lipid panel pending for am    Obesity  - encourage life style modification     Prophylactic measure   - Lovenox 40 mg QD

## 2020-12-06 NOTE — PROGRESS NOTE ADULT - SUBJECTIVE AND OBJECTIVE BOX
[facility] Division of Hospital Medicine  [facility] [phone]    Chief Complaint:  Patient is a 60y old  Male who presents with a chief complaint of COVID Hypoxia (05 Dec 2020 16:40)      SUBJECTIVE / OVERNIGHT EVENTS:  Pt seen and examined at bedside. No acute events reported overnight. Pt currently on BIPAP, FiO2 being weaned down. Pt reports feeling better, improved SOB.    MEDICATIONS  (STANDING):  amLODIPine   Tablet 10 milliGRAM(s) Oral daily  dexAMETHasone  Injectable 6 milliGRAM(s) IV Push daily  dextrose 40% Gel 15 Gram(s) Oral once  dextrose 5%. 1000 milliLiter(s) (50 mL/Hr) IV Continuous <Continuous>  dextrose 5%. 1000 milliLiter(s) (100 mL/Hr) IV Continuous <Continuous>  dextrose 50% Injectable 25 Gram(s) IV Push once  dextrose 50% Injectable 12.5 Gram(s) IV Push once  dextrose 50% Injectable 25 Gram(s) IV Push once  enoxaparin Injectable 40 milliGRAM(s) SubCutaneous daily  glucagon  Injectable 1 milliGRAM(s) IntraMuscular once  insulin lispro (ADMELOG) corrective regimen sliding scale   SubCutaneous three times a day before meals  losartan 100 milliGRAM(s) Oral daily  remdesivir  IVPB 100 milliGRAM(s) IV Intermittent every 24 hours  remdesivir  IVPB   IV Intermittent     MEDICATIONS  (PRN):  ALBUTerol    90 MICROgram(s) HFA Inhaler 2 Puff(s) Inhalation every 6 hours PRN Shortness of Breath and/or Wheezing  hydrALAZINE Injectable 5 milliGRAM(s) IV Push once PRN HTN        I&O's Summary    05 Dec 2020 07:01  -  06 Dec 2020 07:00  --------------------------------------------------------  IN: 100 mL / OUT: 450 mL / NET: -350 mL        PHYSICAL EXAM:  Vital Signs Last 24 Hrs  T(C): 37.9 (06 Dec 2020 12:00), Max: 38.3 (05 Dec 2020 22:47)  T(F): 100.2 (06 Dec 2020 12:00), Max: 101 (05 Dec 2020 22:47)  HR: 65 (06 Dec 2020 12:00) (61 - 75)  BP: 152/68 (06 Dec 2020 12:00) (152/68 - 174/84)  BP(mean): 77 (06 Dec 2020 12:00) (75 - 98)  RR: 32 (06 Dec 2020 12:44) (22 - 38)  SpO2: 93% (06 Dec 2020 12:44) (78% - 97%)        CONSTITUTIONAL: Middle aged man, on BIPAP in NAD  HEENT: NC/AT, PERRL, no JVD  RESPIRATORY: CTA bilaterally, normal effort  CARDIOVASCULAR: RRR, S1/S2+, no m/g/r  ABDOMEN: Nontender to palpation, normoactive bowel sounds, no rebound/guarding; No hepatosplenomegaly  MUSCLOSKELETAL: No edema, cyanosis or deformities.  PSYCH: A+O to person, place, and time; affect appropriate  NEUROLOGY: CN 2-12 are intact and symmetric; no gross neurological deficits.  SKIN: No rashes; no palpable lesions  VASC: Distal pulses palpable    LABS:                        13.6   8.75  )-----------( 215      ( 06 Dec 2020 08:44 )             39.3     12-06    140  |  101  |  23.0<H>  ----------------------------<  185<H>  3.8   |  26.0  |  0.70    Ca    8.6      06 Dec 2020 08:44  Phos  2.4     12-06  Mg     2.6     12-06    TPro  6.8  /  Alb  3.1<L>  /  TBili  0.4  /  DBili  0.1  /  AST  38  /  ALT  21  /  AlkPhos  105  12-06              CAPILLARY BLOOD GLUCOSE      POCT Blood Glucose.: 201 mg/dL (06 Dec 2020 11:19)        RADIOLOGY & ADDITIONAL TESTS:  Results Reviewed:   Imaging Personally Reviewed:  Electrocardiogram Personally Reviewed:

## 2020-12-06 NOTE — PROGRESS NOTE ADULT - ASSESSMENT
59 yo male w/ pmhx of HLD, DM presents to the ED w/ complaints of SOB and dyspnea. Patient will be admitted for Acute respiratory distress w/ evidence of hypoxia due to COVID- PNA    #Acute hypoxemic respiratory failure 2/2 to COVID-19  - Repeat inflammatory markers q 48  - ID consulted  - Albuterol inhaler PRN  - C/w Decadron  - C/w Remdisivir  - Encourage proning, indcdentive spirometry  - Supplemental oxygen prn, wean as tolerated. Currently on BIPAP will try to trnasition to HFNC today    #DM2  - A1C: 6.6  - FS/LAKE while on Decadron    #Obesity  - encourage life style modification     Prophylactic measure   - Lovenox 40 mg QD     Dispo/LOS anticipation: Pending clinical course. Currently on BIPAP, will wean off.

## 2020-12-07 NOTE — PROGRESS NOTE ADULT - SUBJECTIVE AND OBJECTIVE BOX
[facility] Division of Hospital Medicine  [facility] [phone]    Chief Complaint:  Patient is a 60y old  Male who presents with a chief complaint of COVID Hypoxia (05 Dec 2020 16:40)      SUBJECTIVE / OVERNIGHT EVENTS:  Pt seen and examined at bedside. No acute events reported overnight. He was switched over to HFNC from BIPAP. He states he feels much better. No new complaints.    MEDICATIONS  (STANDING):  amLODIPine   Tablet 10 milliGRAM(s) Oral daily  dexAMETHasone  Injectable 6 milliGRAM(s) IV Push daily  dextrose 40% Gel 15 Gram(s) Oral once  dextrose 5%. 1000 milliLiter(s) (50 mL/Hr) IV Continuous <Continuous>  dextrose 5%. 1000 milliLiter(s) (100 mL/Hr) IV Continuous <Continuous>  dextrose 50% Injectable 25 Gram(s) IV Push once  dextrose 50% Injectable 12.5 Gram(s) IV Push once  dextrose 50% Injectable 25 Gram(s) IV Push once  enoxaparin Injectable 40 milliGRAM(s) SubCutaneous daily  glucagon  Injectable 1 milliGRAM(s) IntraMuscular once  insulin lispro (ADMELOG) corrective regimen sliding scale   SubCutaneous three times a day before meals  losartan 100 milliGRAM(s) Oral daily  remdesivir  IVPB 100 milliGRAM(s) IV Intermittent every 24 hours  remdesivir  IVPB   IV Intermittent     MEDICATIONS  (PRN):  ALBUTerol    90 MICROgram(s) HFA Inhaler 2 Puff(s) Inhalation every 6 hours PRN Shortness of Breath and/or Wheezing  hydrALAZINE Injectable 5 milliGRAM(s) IV Push once PRN HTN        I&O's Summary    05 Dec 2020 07:01  -  06 Dec 2020 07:00  --------------------------------------------------------  IN: 100 mL / OUT: 450 mL / NET: -350 mL        PHYSICAL EXAM:  Vital Signs Last 24 Hrs  T(C): 37.9 (06 Dec 2020 12:00), Max: 38.3 (05 Dec 2020 22:47)  T(F): 100.2 (06 Dec 2020 12:00), Max: 101 (05 Dec 2020 22:47)  HR: 65 (06 Dec 2020 12:00) (61 - 75)  BP: 152/68 (06 Dec 2020 12:00) (152/68 - 174/84)  BP(mean): 77 (06 Dec 2020 12:00) (75 - 98)  RR: 32 (06 Dec 2020 12:44) (22 - 38)  SpO2: 93% (06 Dec 2020 12:44) (78% - 97%)        CONSTITUTIONAL: Middle aged man, on HFNC in NAD  HEENT: NC/AT, PERRL, no JVD  RESPIRATORY: CTA bilaterally, normal effort  CARDIOVASCULAR: RRR, S1/S2+, no m/g/r  ABDOMEN: Nontender to palpation, normoactive bowel sounds, no rebound/guarding; No hepatosplenomegaly  MUSCLOSKELETAL: No edema, cyanosis or deformities.  PSYCH: A+O to person, place, and time; affect appropriate  NEUROLOGY: CN 2-12 are intact and symmetric; no gross neurological deficits.  SKIN: No rashes; no palpable lesions  VASC: Distal pulses palpable    LABS:                        13.6   8.75  )-----------( 215      ( 06 Dec 2020 08:44 )             39.3     12-06    140  |  101  |  23.0<H>  ----------------------------<  185<H>  3.8   |  26.0  |  0.70    Ca    8.6      06 Dec 2020 08:44  Phos  2.4     12-06  Mg     2.6     12-06    TPro  6.8  /  Alb  3.1<L>  /  TBili  0.4  /  DBili  0.1  /  AST  38  /  ALT  21  /  AlkPhos  105  12-06              CAPILLARY BLOOD GLUCOSE      POCT Blood Glucose.: 201 mg/dL (06 Dec 2020 11:19)        RADIOLOGY & ADDITIONAL TESTS:  Results Reviewed:   Imaging Personally Reviewed:  Electrocardiogram Personally Reviewed:

## 2020-12-07 NOTE — PROGRESS NOTE ADULT - ASSESSMENT
A 61 yo male w/ pmhx of HLD, HTN, DM presents to the ED w/ complaints of SOB and dyspnea. The patient lives at home w/ his wife and kids. The patient was recently around his children who were positive w/ covid, at home the patient noticed at rest he was having dyspnea w/o minimal exertion.PT WITH COVID POSITIVE HERE CURRENTLY ON BIPAP AWAKE AND ALERT  PT  STARTED ON STEROIDS  WOULD CONTINUE  REMDESIVIR       RECOMMEND FOLLOWUP INFLAMMATORY MARKERS   ANTICOAGULATION AS PER Protocols  SUGGEST PRONE Position     WILL FOLLOW UP WITH [facility]ER RECOMMENDATIONS

## 2020-12-07 NOTE — PROGRESS NOTE ADULT - SUBJECTIVE AND OBJECTIVE BOX
INFECTIOUS DISEASES AND INTERNAL MEDICINE at [place]  =======================================================  Neeraj Johnson MD  Diplomates American Board of Internal Medicine and Infectious Diseases  Telephone [phone]  Fax            [phone]  =======================================================    [address] [place] [id number]    Follow up: covid 19    Allergies:  No Known Allergies      Medications:  acetaminophen   Tablet .. 650 milliGRAM(s) Oral every 6 hours PRN  ALBUTerol    90 MICROgram(s) HFA Inhaler 2 Puff(s) Inhalation every 6 hours PRN  amLODIPine   Tablet 10 milliGRAM(s) Oral daily  benzonatate 100 milliGRAM(s) Oral three times a day  dexAMETHasone  Injectable 6 milliGRAM(s) IV Push daily  dextrose 40% Gel 15 Gram(s) Oral once  dextrose 5%. 1000 milliLiter(s) IV Continuous <Continuous>  dextrose 5%. 1000 milliLiter(s) IV Continuous <Continuous>  dextrose 50% Injectable 25 Gram(s) IV Push once  dextrose 50% Injectable 12.5 Gram(s) IV Push once  dextrose 50% Injectable 25 Gram(s) IV Push once  glucagon  Injectable 1 milliGRAM(s) IntraMuscular once  heparin   Injectable 6500 Unit(s) IV Push every 6 hours PRN  heparin   Injectable 3000 Unit(s) IV Push every 6 hours PRN  heparin  Infusion.  Unit(s)/Hr IV Continuous <Continuous>  hydrALAZINE Injectable 5 milliGRAM(s) IV Push once PRN  insulin glargine Injectable (LANTUS) 10 Unit(s) SubCutaneous at bedtime  insulin lispro (ADMELOG) corrective regimen sliding scale   SubCutaneous three times a day before meals  losartan 100 milliGRAM(s) Oral daily  remdesivir  IVPB 100 milliGRAM(s) IV Intermittent every 24 hours  remdesivir  IVPB   IV Intermittent     SOCIAL       FAMILY   FAMILY HISTORY:    REVIEW OF SYSTEMS:  CONSTITUTIONAL:  No Fever or chills  HEENT:   No diplopia or blurred vision.  No earache, sore throat or runny nose.  CARDIOVASCULAR:  No pressure, squeezing, strangling, tightness, heaviness or aching about the chest, neck, axilla or epigastrium.  RESPIRATORY:   cough, shortness of breath .  GASTROINTESTINAL:  No nausea, vomiting or diarrhea.  GENITOURINARY:  No dysuria, frequency or urgency. No Blood in urine  MUSCULOSKELETAL:   moves all joints  SKIN:  No change in skin, hair or nails.  NEUROLOGIC:  No paresthesias, fasciculations, seizures or weakness.  PSYCHIATRIC:  No disorder of thought or mood.  ENDOCRINE:  No heat or cold intolerance, polyuria or polydipsia.  HEMATOLOGICAL:  No easy bruising or bleeding.            Physical Exam:  I Vital Signs Last 24 Hrs  T(C): 37.1 (07 Dec 2020 16:10), Max: 37.3 (07 Dec 2020 00:00)  T(F): 98.7 (07 Dec 2020 16:10), Max: 99.2 (07 Dec 2020 00:00)  HR: 88 (07 Dec 2020 16:10) (66 - 88)  BP: 134/74 (07 Dec 2020 16:10) (134/74 - 162/78)  BP(mean): 101 (07 Dec 2020 06:10) (89 - 101)  RR: 21 (07 Dec 2020 16:10) (21 - 30)  SpO2: 95% (07 Dec 2020 16:10) (78% - 98%)    GEN: NAD, OOB TO CHAIR ON HI FLOW  HEENT: normocephalic and atraumatic. EOMI. MINOR.    NECK: Supple. No carotid bruits.  No lymphadenopathy or thyromegaly.  LUNGS: Clear to auscultation.  HEART: Regular rate and rhythm without murmur.  ABDOMEN: Soft, nontender, and nondistended.  Positive bowel sounds.    : No CVA tenderness  EXTREMITIES: Without any cyanosis, clubbing, rash, lesions or edema.  MSK: no joint swelling  NEUROLOGIC: Cranial nerves II through XII are grossly intact.  PSYCHIATRIC: Appropriate affect .  SKIN: No ulceration or induration present.        Labs:  Vitals:  ============  T(F): 98.7 (07 Dec 2020 16:10), Max: 99.2 (07 Dec 2020 00:00)  HR: 88 (07 Dec 2020 16:10)  BP: 134/74 (07 Dec 2020 16:10)  RR: 21 (07 Dec 2020 16:10)  SpO2: 95% (07 Dec 2020 16:10) (78% - 98%)  temp max in last 48H T(F): , Max: 101 (12-05-20 @ 22:47)    =======================================================  Current Antibiotics:  remdesivir  IVPB 100 milliGRAM(s) IV Intermittent every 24 hours  remdesivir  IVPB   IV Intermittent     Other medications:  amLODIPine   Tablet 10 milliGRAM(s) Oral daily  benzonatate 100 milliGRAM(s) Oral three times a day  dexAMETHasone  Injectable 6 milliGRAM(s) IV Push daily  dextrose 40% Gel 15 Gram(s) Oral once  dextrose 5%. 1000 milliLiter(s) IV Continuous <Continuous>  dextrose 5%. 1000 milliLiter(s) IV Continuous <Continuous>  dextrose 50% Injectable 25 Gram(s) IV Push once  dextrose 50% Injectable 12.5 Gram(s) IV Push once  dextrose 50% Injectable 25 Gram(s) IV Push once  glucagon  Injectable 1 milliGRAM(s) IntraMuscular once  heparin  Infusion.  Unit(s)/Hr IV Continuous <Continuous>  insulin glargine Injectable (LANTUS) 10 Unit(s) SubCutaneous at bedtime  insulin lispro (ADMELOG) corrective regimen sliding scale   SubCutaneous three times a day before meals  losartan 100 milliGRAM(s) Oral daily      =======================================================  Labs:                        12.9   13.10 )-----------( 236      ( 07 Dec 2020 08:07 )             37.6      12-07    141  |  103  |  25.0<H>  ----------------------------<  179<H>  3.5   |  26.0  |  0.67    Ca    8.4<L>      07 Dec 2020 08:07  Phos  2.4     12-06  Mg     2.6     12-06    TPro  6.4<L>  /  Alb  2.9<L>  /  TBili  0.5  /  DBili  0.2  /  AST  29  /  ALT  17  /  AlkPhos  102  12-07      Creatinine, Serum: 0.67 mg/dL (12-07-20 @ 08:07)  Creatinine, Serum: 0.70 mg/dL (12-06-20 @ 08:44)  Creatinine, Serum: 0.68 mg/dL (12-06-20 @ 08:44)  Creatinine, Serum: 0.84 mg/dL (12-05-20 @ 05:00)    Procalcitonin, Serum: 0.44 ng/mL (12-07-20 @ 08:07)  Procalcitonin, Serum: 0.21 ng/mL (12-05-20 @ 05:00)    D-Dimer Assay, Quantitative: 3495 ng/mL DDU (12-07-20 @ 08:07)  D-Dimer Assay, Quantitative: 411 ng/mL DDU (12-05-20 @ 05:00)    Ferritin, Serum: 794 ng/mL (12-07-20 @ 08:07)  Ferritin, Serum: 532 ng/mL (12-05-20 @ 05:00)    C-Reactive Protein, Serum: 18.21 mg/dL (12-07-20 @ 08:07)  C-Reactive Protein, Serum: 31.85 mg/dL (12-05-20 @ 05:00)    WBC Count: 13.10 K/uL (12-07-20 @ 08:07)  WBC Count: 8.75 K/uL (12-06-20 @ 08:44)  WBC Count: 8.63 K/uL (12-05-20 @ 05:00)      COVID-19 IgG Antibody Interpretation: Negative (12-05-20 @ 11:44)  COVID-19 IgG Antibody Index: 0.21 Index (12-05-20 @ 11:44)    Lactate Dehydrogenase, Serum: 780 U/L (12-07-20 @ 08:07)    Alkaline Phosphatase, Serum: 102 U/L (12-07-20 @ 08:07)  Alkaline Phosphatase, Serum: 105 U/L (12-07-20 @ 08:07)  Alkaline Phosphatase, Serum: 105 U/L (12-06-20 @ 08:45)  Alkaline Phosphatase, Serum: 107 U/L (12-06-20 @ 08:44)  Alkaline Phosphatase, Serum: 105 U/L (12-06-20 @ 08:44)  Alkaline Phosphatase, Serum: 116 U/L (12-05-20 @ 05:00)  Alanine Aminotransferase (ALT/SGPT): 17 U/L (12-07-20 @ 08:07)  Alanine Aminotransferase (ALT/SGPT): 19 U/L (12-07-20 @ 08:07)  Alanine Aminotransferase (ALT/SGPT): 21 U/L (12-06-20 @ 08:45)  Alanine Aminotransferase (ALT/SGPT): 22 U/L (12-06-20 @ 08:44)  Alanine Aminotransferase (ALT/SGPT): 22 U/L (12-06-20 @ 08:44)  Alanine Aminotransferase (ALT/SGPT): 29 U/L (12-05-20 @ 05:00)  Aspartate Aminotransferase (AST/SGOT): 29 U/L (12-07-20 @ 08:07)  Aspartate Aminotransferase (AST/SGOT): 30 U/L (12-07-20 @ 08:07)  Aspartate Aminotransferase (AST/SGOT): 38 U/L (12-06-20 @ 08:45)  Aspartate Aminotransferase (AST/SGOT): 40 U/L (12-06-20 @ 08:44)  Aspartate Aminotransferase (AST/SGOT): 42 U/L (12-06-20 @ 08:44)  Aspartate Aminotransferase (AST/SGOT): 46 U/L (12-05-20 @ 05:00)  Bilirubin Total, Serum: 0.5 mg/dL (12-07-20 @ 08:07)  Bilirubin Total, Serum: 0.5 mg/dL (12-07-20 @ 08:07)  Bilirubin Total, Serum: 0.4 mg/dL (12-06-20 @ 08:45)  Bilirubin Total, Serum: 0.4 mg/dL (12-06-20 @ 08:44)  Bilirubin Total, Serum: 0.4 mg/dL (12-06-20 @ 08:44)  Bilirubin Total, Serum: 0.5 mg/dL (12-05-20 @ 05:00)  Bilirubin Direct, Serum: 0.2 mg/dL (12-07-20 @ 08:07)  Bilirubin Direct, Serum: 0.1 mg/dL (12-06-20 @ 08:45)  Bilirubin Direct, Serum: 0.1 mg/dL (12-06-20 @ 08:44)

## 2020-12-07 NOTE — PROGRESS NOTE ADULT - ASSESSMENT
61 yo male w/ pmhx of HLD, DM presents to the ED w/ complaints of SOB and dyspnea. Patient will be admitted for Acute respiratory distress w/ evidence of hypoxia due to COVID- PNA    #Acute hypoxemic respiratory failure 2/2 to COVID-19  - Repeat inflammatory markers q 48h  - ID following  - Albuterol inhaler PRN  - C/w Decadron  - C/w Remdisivir  - Encourage proning, indcdentive spirometry  - Supplemental oxygen prn, wean as tolerated. Currently on HFNC    #DM2  - A1C: 6.6  - FS/LAKE while on Decadron    #Obesity  - encourage life style modification     Prophylactic measure   - Lovenox 40 mg QD     Dispo/LOS anticipation: Pending clinical course. Currently on HFNC, pending improvement of oxygenation.    Pt's son was called, no answer. VM left. 61 yo male w/ pmhx of HLD, DM presents to the ED w/ complaints of SOB and dyspnea. Patient will be admitted for Acute respiratory distress w/ evidence of hypoxia due to COVID- PNA    #Acute hypoxemic respiratory failure 2/2 to COVID-19  - Repeat inflammatory markers q 48h  - ID following  - Albuterol inhaler PRN  - C/w Decadron  - C/w Remdisivir  - Encourage proning, incentive spirometry  - Supplemental oxygen prn, wean as tolerated. Currently on HFNC    Elevated D-dimer increased likely in setting of COVID-19   - LE venous duplex ordered    #DM2  - A1C: 6.6  - FS/LAKE while on Decadron    #Obesity  - encourage life style modification     Prophylactic measure   - Lovenox 40 mg QD     Dispo/LOS anticipation: Pending clinical course. Currently on HFNC, pending improvement of oxygenation.    Pt's son was called, no answer. VM left. 61 yo male w/ pmhx of HLD, DM presents to the ED w/ complaints of SOB and dyspnea. Patient will be admitted for Acute respiratory distress w/ evidence of hypoxia due to COVID- PNA    #Acute hypoxemic respiratory failure 2/2 to COVID-19  - Repeat inflammatory markers q 48h  - ID following  - Albuterol inhaler PRN  - C/w Decadron  - C/w Remdisivir  - Encourage proning, incentive spirometry  - Supplemental oxygen prn, wean as tolerated. Currently on HFNC    Elevated D-dimer increased likely in setting of COVID-19   - LE venous duplex ordered    #DM2  - A1C: 6.6  - FS/LAKE while on Decadron    #Obesity  - encourage life style modification     Prophylactic measure   - Lovenox 40 mg BID (BMI >30)    Dispo/LOS anticipation: Pending clinical course. Currently on HFNC, pending improvement of oxygenation.    Pt's son was called, no answer. VM left. 59 yo male w/ pmhx of HLD, DM presents to the ED w/ complaints of SOB and dyspnea. Patient will be admitted for Acute respiratory distress w/ evidence of hypoxia due to COVID- PNA    #Acute hypoxemic respiratory failure 2/2 to COVID-19  - Repeat inflammatory markers q 48h  - ID following  - Albuterol inhaler PRN  - C/w Decadron  - C/w Remdisivir  - Encourage proning, incentive spirometry  - Supplemental oxygen prn, wean as tolerated. Currently on HFNC    Elevated D-dimer increased likely in setting of COVID-19   - LE venous duplex ordered    #DM2  - Start Lantus 10u qhs  - A1C: 6.6  - FS/LAKE while on Decadron    #Obesity  - encourage life style modification     Prophylactic measure   - Lovenox 40 mg BID (BMI >30)    Dispo/LOS anticipation: Pending clinical course. Currently on HFNC, pending improvement of oxygenation.    Pt's son was called, no answer. VM left. 61 yo male w/ pmhx of HLD, DM presents to the ED w/ complaints of SOB and dyspnea. Patient will be admitted for Acute respiratory distress w/ evidence of hypoxia due to COVID- PNA    #Acute hypoxemic respiratory failure 2/2 to COVID-19  - Repeat inflammatory markers q 48h  - ID following  - Albuterol inhaler PRN  - C/w Decadron  - C/w Remdisivir  - Encourage proning, incentive spirometry  - Supplemental oxygen prn, wean as tolerated. Currently on HFNC    Elevated D-dimer increased likely in setting of COVID-19   - LE venous duplex ordered - bilateral DVT - will order Heparin gtt and CTA chest to r/o PE    #DM2  - Start Lantus 10u qhs  - A1C: 6.6  - FS/LAKE while on Decadron    #Obesity  - encourage life style modification     Prophylactic measure   - Heparin gtt    Dispo/LOS anticipation: Pending clinical course. Currently on HFNC, pending improvement of oxygenation.    Pt's son was updated regarding medical condition

## 2020-12-08 NOTE — PROGRESS NOTE ADULT - SUBJECTIVE AND OBJECTIVE BOX
[facility] Division of Hospital Medicine  Blaze Romo [phone]    Chief Complaint:  Patient is a 60y old  Male who presents with a chief complaint of COVID Hypoxia (05 Dec 2020 16:40)      SUBJECTIVE / OVERNIGHT EVENTS:  Pt seen and examined at bedside. LE venous duplex yesterday positive for bilateral DVT. Patient started on Heparin gtt which was transitioned to Eliquis this morning. Patient seen w/ video  ([id number]). States he feels better, wants to go home to pay bills. He was notified of his guarded prognosis and the fact he is still requiring 100% HFNC and then was willing to stay. No new complaints.     MEDICATIONS  (STANDING):  amLODIPine   Tablet 10 milliGRAM(s) Oral daily  dexAMETHasone  Injectable 6 milliGRAM(s) IV Push daily  dextrose 40% Gel 15 Gram(s) Oral once  dextrose 5%. 1000 milliLiter(s) (50 mL/Hr) IV Continuous <Continuous>  dextrose 5%. 1000 milliLiter(s) (100 mL/Hr) IV Continuous <Continuous>  dextrose 50% Injectable 25 Gram(s) IV Push once  dextrose 50% Injectable 12.5 Gram(s) IV Push once  dextrose 50% Injectable 25 Gram(s) IV Push once  enoxaparin Injectable 40 milliGRAM(s) SubCutaneous daily  glucagon  Injectable 1 milliGRAM(s) IntraMuscular once  insulin lispro (ADMELOG) corrective regimen sliding scale   SubCutaneous three times a day before meals  losartan 100 milliGRAM(s) Oral daily  remdesivir  IVPB 100 milliGRAM(s) IV Intermittent every 24 hours  remdesivir  IVPB   IV Intermittent     MEDICATIONS  (PRN):  ALBUTerol    90 MICROgram(s) HFA Inhaler 2 Puff(s) Inhalation every 6 hours PRN Shortness of Breath and/or Wheezing  hydrALAZINE Injectable 5 milliGRAM(s) IV Push once PRN HTN        I&O's Summary    05 Dec 2020 07:01  -  06 Dec 2020 07:00  --------------------------------------------------------  IN: 100 mL / OUT: 450 mL / NET: -350 mL        PHYSICAL EXAM:  Vital Signs Last 24 Hrs  T(C): 37.9 (06 Dec 2020 12:00), Max: 38.3 (05 Dec 2020 22:47)  T(F): 100.2 (06 Dec 2020 12:00), Max: 101 (05 Dec 2020 22:47)  HR: 65 (06 Dec 2020 12:00) (61 - 75)  BP: 152/68 (06 Dec 2020 12:00) (152/68 - 174/84)  BP(mean): 77 (06 Dec 2020 12:00) (75 - 98)  RR: 32 (06 Dec 2020 12:44) (22 - 38)  SpO2: 93% (06 Dec 2020 12:44) (78% - 97%)        CONSTITUTIONAL: Middle aged man, on HFNC in NAD  HEENT: NC/AT, PERRL, no JVD  RESPIRATORY: CTA bilaterally, mild tahcypnea  CARDIOVASCULAR: RRR, S1/S2+, no m/g/r  ABDOMEN: Nontender to palpation, normoactive bowel sounds, no rebound/guarding; No hepatosplenomegaly  MUSCLOSKELETAL: No edema, cyanosis or deformities.  PSYCH: A+O to person, place, and time; affect appropriate  NEUROLOGY: CN 2-12 are intact and symmetric; no gross neurological deficits.  SKIN: No rashes; no palpable lesions  VASC: Distal pulses palpable    LABS:                        13.6   8.75  )-----------( 215      ( 06 Dec 2020 08:44 )             39.3     12-06    140  |  101  |  23.0<H>  ----------------------------<  185<H>  3.8   |  26.0  |  0.70    Ca    8.6      06 Dec 2020 08:44  Phos  2.4     12-06  Mg     2.6     12-06    TPro  6.8  /  Alb  3.1<L>  /  TBili  0.4  /  DBili  0.1  /  AST  38  /  ALT  21  /  AlkPhos  105  12-06              CAPILLARY BLOOD GLUCOSE      POCT Blood Glucose.: 201 mg/dL (06 Dec 2020 11:19)        RADIOLOGY & ADDITIONAL TESTS:  Results Reviewed:   Imaging Personally Reviewed:  Electrocardiogram Personally Reviewed:

## 2020-12-08 NOTE — PROGRESS NOTE ADULT - ASSESSMENT
61 yo male w/ pmhx of HLD, DM presents to the ED w/ complaints of SOB and dyspnea. Patient will be admitted for Acute respiratory distress w/ evidence of hypoxia due to COVID- PNA    #Acute hypoxemic respiratory failure 2/2 to COVID-19, possible P/E  - Repeat inflammatory markers q 48h  - ID following  - Albuterol inhaler PRN  - C/w Decadron  - C/w Remdisivir  - Encourage proning, incentive spirometry  - Supplemental oxygen prn, wean as tolerated. Currently on HFNC  - C/w Eliquis, will defer CTA as patient currently unstable for transport and will not change mgmt.     #Bilateral DVT  - Eliquis    #DM2  - Lantus 10u qhs  - A1C: 6.6  - FS/LAKE while on Decadron    #Obesity  - encourage life style modification     DVT ppx; Eliquis    Dispo/LOS anticipation: Pending clinical course. Currently on HFNC, pending improvement of oxygenation.    Pt's son Nayan was updated regarding medical condition

## 2020-12-09 NOTE — CONSULT NOTE ADULT - ATTENDING COMMENTS
61 yo male with hx of HTN, HLD, now with ARDS due to COVID19 viral pneumonia, DVT, hyperglycemia requiring insulin drip.  Plan is for ARDSnet low Vt ventilation, prone position, steroids, remdesivir, full dose anticoagulation.

## 2020-12-09 NOTE — CHART NOTE - NSCHARTNOTEFT_GEN_A_CORE
Pt on HF NC at start of shift, Non rebreather added. Sating fine for few hrs. Pt started to desat to 70s. BIPAP started.   Pt pulling BIPAP off, States it was suffocating him. Desat to 70s  Ativan 2mg IVP, pt proned, will not leave BIPAP on  Pt educated , Still refusing  A+OX3 prone O2 sat 70%  B/P 176/84 P 112 R 50 PO 65%  MICU PA called   MICU PA and attending at bedside Pt on HF NC at start of shift, Non rebreather added. Sating fine for few hrs. Pt started to desat to 70s. BIPAP started.   Pt pulling BIPAP off, States it was suffocating him. Desat to 70s  Ativan 2mg IVP, pt proned, will not leave BIPAP on  Pt educated , Still refusing  A+OX3 prone O2 sat 70%  B/P 176/84 P 112 R 50 PO 65%  MICU PA called   MICU PA and attending at bedside  pt being intubated

## 2020-12-09 NOTE — CHART NOTE - NSCHARTNOTEFT_GEN_A_CORE
CODE BLUE called overhead. Arrived at pt's bedside to find that he had self-extubated while proned. SpO2 30s, bradycardic, hyptensensive. Never lost a pulse. Pt was flipped to the supine position, and RN had begun bagging pt prior to my arrival. SpO2 slowly increased to the 90s. Pt was medicated w/ propofol 80cc and succinylcholine 100mg, subsequently re-intubated w/o complication. Will obtain post intubated CXR and ABG. CODE BLUE called overhead. Arrived at pt's bedside to find that he had self-extubated while proned. SpO2 30s, bradycardic, hypertensive. Never lost a pulse. Pt was flipped to the supine position, and RN had begun bagging pt prior to my arrival. SpO2 slowly increased to the 90s. Pt was medicated w/ propofol 80cc and succinylcholine 100mg, subsequently re-intubated w/o complication. Will obtain post intubated CXR and ABG. CODE BLUE called overhead. Arrived at pt's bedside to find that he had self-extubated while proned. SpO2 30s, bradycardic, hypertensive. Never lost a pulse. Pt was flipped to the supine position, and RN had begun bagging pt prior to my arrival. SpO2 slowly increased to the 90s. Pt was medicated w/ propofol 80cc and succinylcholine 100mg, subsequently re-intubated w/o complication. Will obtain post intubation CXR and ABG.

## 2020-12-09 NOTE — PROCEDURE NOTE - NSTRACHPOSTINTU_RESP_A_CORE
Chest X-Ray/Chest excursion noted/Breath sounds bilateral
Breath sounds bilateral/Breath sounds equal/Chest X-Ray/Chest excursion noted/Positive end tidal Co2 noted

## 2020-12-09 NOTE — PROCEDURE NOTE - NSPOSTPRCRAD_GEN_A_CORE
post-procedure radiography performed
post-procedure radiography performed/central line located in the superior vena cava/no pneumothorax

## 2020-12-09 NOTE — CHART NOTE - NSCHARTNOTEFT_GEN_A_CORE
Was called to bedside patient was in respiratory distress, unable to tolerate highflow NC or BIPAP mask. Assisted in intubation. Intubated by STEPHANIE Sampson. 7.5 ETT, 24 at the lip, Xray to follow. Placed on Ventilator with the following settings AC/VC RR28/340/100% peep 16.

## 2020-12-09 NOTE — CONSULT NOTE ADULT - ASSESSMENT
59 y/o obese male with pmhx of HTN, HLD now with:    1. severe ARDS and acute hypoxic respiratory failure secondary to COVID-19 pneumonia  2. HTN  3. hyperglycemia    NEURO: sedated on propofol with fentanyl prn for vent synchrony. no indications for paralytics as of yet.     CVS: bp/hr elevated. fentanyl 50 mcg x 1. if remains hypertensive increase standing anti-htn    PULM: intubated on lung protective ventilatory strategy at 6 cc/kg IBW. requiring high PEEP of 16 with 100% fio2. P:F on this is 86 on post intubation abg. prone x 16 hours. increased rate to 32 from 28 for hypercapnia after intubation. f/u abg after proning.     on full dose a/c, while PE is possible he is too unstable for CTA. if he becomes HD unstable low threshold for tpa. continue full a/c.    increase decadron to 20 mg IVP daily x 7 days    GI: npo except meds for now. start tube feeds when placed back supine.   RENAL: keep K > 4, Mg > 2. monitor GFR while on remdesevir  ID: continue remdesevir. no signs of superimposed bacterial infection.  ENDO: given persistently hyperglycemic with BG > 250 and now on high dose decadron will start insulin gtt with q1 hour accuchecks  HEME: continue eliquis.  DISPO: full code    discussed with dr obrien. 61 y/o obese male with pmhx of HTN, HLD now with:    1. severe ARDS and acute hypoxic respiratory failure secondary to COVID-19 pneumonia  2. HTN  3. hyperglycemia    NEURO: sedated on propofol with fentanyl prn for vent synchrony. no indications for paralytics as of yet.     CVS: bp/hr elevated. fentanyl 50 mcg x 1. if remains hypertensive increase standing anti-htn    PULM: intubated on lung protective ventilatory strategy at 6 cc/kg IBW. requiring high PEEP of 16 with 100% fio2. P:F on this is 86 on post intubation abg. prone x 16 hours. increased rate to 32 from 28 for hypercapnia after intubation. f/u abg after proning.     on full dose a/c, while PE is possible he is too unstable for CTA. if he becomes HD unstable low threshold for tpa. continue full a/c.    increase decadron to 20 mg IVP daily x 7 days    GI: npo except meds for now. start tube feeds when placed back supine.   RENAL: keep K > 4, Mg > 2. monitor GFR while on remdesevir  ID: continue remdesevir. no signs of superimposed bacterial infection.  ENDO: given persistently hyperglycemic with BG > 250 and now on high dose decadron will start insulin gtt with q1 hour accuchecks  HEME: continue eliquis.  DISPO: full code. attempted to reach son via number in chart. no answer and mailbox full    discussed with dr obrien.

## 2020-12-10 NOTE — PROGRESS NOTE ADULT - SUBJECTIVE AND OBJECTIVE BOX
INFECTIOUS DISEASES AND INTERNAL MEDICINE at [place]  =======================================================  Neeraj Johnson MD  Diplomates American Board of Internal Medicine and Infectious Diseases  Telephone [phone]  Fax            [phone]  =======================================================    [address] [id number]    Follow up: covid 19 now intubated     Allergies:  No Known Allergies      Medications:  acetaminophen   Tablet .. 650 milliGRAM(s) Oral every 6 hours PRN  ALBUTerol    90 MICROgram(s) HFA Inhaler 2 Puff(s) Inhalation every 6 hours PRN  amLODIPine   Tablet 10 milliGRAM(s) Oral daily  benzonatate 100 milliGRAM(s) Oral three times a day  dexAMETHasone  Injectable 6 milliGRAM(s) IV Push daily  dextrose 40% Gel 15 Gram(s) Oral once  dextrose 5%. 1000 milliLiter(s) IV Continuous <Continuous>  dextrose 5%. 1000 milliLiter(s) IV Continuous <Continuous>  dextrose 50% Injectable 25 Gram(s) IV Push once  dextrose 50% Injectable 12.5 Gram(s) IV Push once  dextrose 50% Injectable 25 Gram(s) IV Push once  glucagon  Injectable 1 milliGRAM(s) IntraMuscular once  heparin   Injectable 6500 Unit(s) IV Push every 6 hours PRN  heparin   Injectable 3000 Unit(s) IV Push every 6 hours PRN  heparin  Infusion.  Unit(s)/Hr IV Continuous <Continuous>  hydrALAZINE Injectable 5 milliGRAM(s) IV Push once PRN  insulin glargine Injectable (LANTUS) 10 Unit(s) SubCutaneous at bedtime  insulin lispro (ADMELOG) corrective regimen sliding scale   SubCutaneous three times a day before meals  losartan 100 milliGRAM(s) Oral daily  r   SOCIAL       FAMILY   FAMILY HISTORY:    REVIEW OF SYSTEMS:  unable to obtain           Physical Exam:  I  Vital Signs Last 24 Hrs  T(C): 37.7 (10 Dec 2020 16:00), Max: 39.3 (10 Dec 2020 08:00)  T(F): 99.8 (10 Dec 2020 16:00), Max: 102.7 (10 Dec 2020 08:00)  HR: 67 (10 Dec 2020 17:00) (67 - 77)  BP: 101/57 (10 Dec 2020 08:00) (101/57 - 101/57)  BP(mean): 71 (10 Dec 2020 08:00) (71 - 71)  RR: 32 (10 Dec 2020 17:00) (16 - 36)  SpO2: 98% (10 Dec 2020 17:00) (91% - 100%)    GEN: NAD, OOB TO CHAIR ON HI FLOW  HEENT: normocephalic and atraumatic. EOMI. MINOR.    NECK: Supple. No carotid bruits.  No lymphadenopathy or thyromegaly.  LUNGS: Clear to auscultation.  HEART: Regular rate and rhythm without murmur.  ABDOMEN: Soft, nontender, and nondistended.  Positive bowel sounds.    : No CVA tenderness  EXTREMITIES: Without any cyanosis, clubbing, rash, lesions or edema.  MSK: no joint swelling  NEUROLOGIC: Cranial nerves II through XII are grossly intact.  PSYCHIATRIC: Appropriate affect .  SKIN: No ulceration or induration present.        Labs:       =======================================================  Current Antibiotics:     Other medications:  amLODIPine   Tablet 10 milliGRAM(s) Oral daily  benzonatate 100 milliGRAM(s) Oral three times a day  dexAMETHasone  Injectable 6 milliGRAM(s) IV Push daily  dextrose 40% Gel 15 Gram(s) Oral once  dextrose 5%. 1000 milliLiter(s) IV Continuous <Continuous>  dextrose 5%. 1000 milliLiter(s) IV Continuous <Continuous>  dextrose 50% Injectable 25 Gram(s) IV Push once  dextrose 50% Injectable 12.5 Gram(s) IV Push once  dextrose 50% Injectable 25 Gram(s) IV Push once  glucagon  Injectable 1 milliGRAM(s) IntraMuscular once  heparin  Infusion.  Unit(s)/Hr IV Continuous <Continuous>  insulin glargine Injectable (LANTUS) 10 Unit(s) SubCutaneous at bedtime  insulin lispro (ADMELOG) corrective regimen sliding scale   SubCutaneous three times a day before meals  losartan 100 milliGRAM(s) Oral daily      =======================================================  Labs:                                11.9   31.07 )-----------( 149      ( 10 Dec 2020 05:37 )             35.2   12-10    141  |  100  |  46.0<H>  ----------------------------<  179<H>  4.3   |  27.0  |  2.29<H>    Ca    7.7<L>      10 Dec 2020 05:37  Phos  5.2     12-10  Mg     2.5     12-10    TPro  5.8<L>  /  Alb  2.2<L>  /  TBili  0.5  /  DBili  x   /  AST  23  /  ALT  17  /  AlkPhos  108  12-10

## 2020-12-10 NOTE — DIETITIAN INITIAL EVALUATION ADULT. - ENTERAL
as medically feasible, initiate Glucerna 1.5 @ 20 ml/hr and advance 10 ml/hr q4 hrs until goal rate of 50 ml/hr (x20 hrs) at this time; provides 1000 ml, 1500 kcal, 81g protein, 759 ml free water, and 100% of RDIs for vitamins/minerals; additional free water per MD discretion.

## 2020-12-10 NOTE — DIETITIAN INITIAL EVALUATION ADULT. - OTHER INFO
60 year old obese male with PMH of HTN, HLD admitted on 12/5 with COVID-19 with hospital course complicated by bilateral LE DVT and severe ARDS. Pt self extubated while proned, now re-intubated. NPO at this time. Propofol running at 14.7 ml/hr (x24 hrs would provide ~388 kcal). No edema noted. Skin intact. Last BM 12/8.

## 2020-12-10 NOTE — CONSULT NOTE ADULT - ASSESSMENT
1) Acute kidney injury  2) Hypoxic resp failure  3) COVID 19 PNA  4) Obesity    Pt with likely hemodynamic ATN  Scr with abrupt rise 0.7--> 2.2  Non oliguric  No indication for RRT; will monitor closely    Trend SCr, lytes, UOP

## 2020-12-10 NOTE — PROGRESS NOTE ADULT - ASSESSMENT
A 61 yo male w/ pmhx of HLD, HTN, DM presents to the ED w/ complaints of SOB and dyspnea. The patient lives at home w/ his wife and kids. The patient was recently around his children who were positive w/ covid, at home the patient noticed at rest he was having dyspnea w/o minimal exertion.PT WITH COVID POSITIVE HERE CURRENTLY ON BIPAP AWAKE AND ALERT  PT    ON STEROIDS  COMPLETED   REMDESIVIR     RECOMMEND FOLLOWUP INFLAMMATORY MARKERS   ANTICOAGULATION AS PER Protocols  SUGGEST PRONE Position  ICU FOLLOWING     WILL FOLLOW UP WITH HAYDEN RECOMMENDATIONS

## 2020-12-10 NOTE — DIETITIAN INITIAL EVALUATION ADULT. - PERTINENT LABORATORY DATA
12-10 Na141 mmol/L Glu 179 mg/dL<H> K+ 4.3 mmol/L Cr  2.29 mg/dL<H> BUN 46.0 mg/dL<H> Phos 5.2 mg/dL<H> Alb 2.2 g/dL<L> PAB n/a HgA1c 6.6%

## 2020-12-10 NOTE — DIETITIAN INITIAL EVALUATION ADULT. - ADD RECOMMEND
Continue vitamin C and thiamine supplementation, add MVI and vitamin D daily. Obtain daily weights to monitor trends.

## 2020-12-10 NOTE — PROGRESS NOTE ADULT - SUBJECTIVE AND OBJECTIVE BOX
Patient is a 60y old  Male who presents with a chief complaint of COVID Hypoxia (10 Dec 2020 13:49)      BRIEF HOSPITAL COURSE: ***    Events last 24 hours:     - self extubated on  12/9/2020  with  re-intubation - stabilized  now was proned on  a/c mode / sedation  improved oxygenation  currently fiO2  70%  on propofol  fenatnyl  versed    heparin drip     PAST MEDICAL & SURGICAL HISTORY:  Hyperlipidemia          Medications:    norepinephrine Infusion 0.05 MICROgram(s)/kG/Min IV Continuous <Continuous>    ALBUTerol    90 MICROgram(s) HFA Inhaler 2 Puff(s) Inhalation every 6 hours PRN  benzonatate 100 milliGRAM(s) Oral three times a day    acetaminophen   Tablet .. 650 milliGRAM(s) Oral every 6 hours PRN  acetaminophen  Suppository .. 650 milliGRAM(s) Rectal every 6 hours PRN  fentaNYL    Injectable 50 MICROGram(s) IV Push every 4 hours PRN  fentaNYL   Infusion 3 MICROgram(s)/kG/Hr IV Continuous <Continuous>  midazolam Infusion 0.1 mG/kG/Hr IV Continuous <Continuous>  propofol Infusion 40 MICROgram(s)/kG/Min IV Continuous <Continuous>      heparin   Injectable 6500 Unit(s) IV Push every 6 hours PRN  heparin   Injectable 3000 Unit(s) IV Push every 6 hours PRN  heparin  Infusion.  Unit(s)/Hr IV Continuous <Continuous>    pantoprazole   Suspension 40 milliGRAM(s) Oral daily      dexAMETHasone  IVPB 20 milliGRAM(s) IV Intermittent daily  dextrose 50% Injectable 25 Gram(s) IV Push once  dextrose 50% Injectable 12.5 Gram(s) IV Push once  dextrose 50% Injectable 25 Gram(s) IV Push once  insulin lispro (ADMELOG) corrective regimen sliding scale   SubCutaneous every 6 hours    ascorbic acid 1000 milliGRAM(s) Oral daily  thiamine 100 milliGRAM(s) Oral daily  zinc sulfate 220 milliGRAM(s) Oral daily      artificial  tears Solution 1 Drop(s) Both EYES two times a day  chlorhexidine 0.12% Liquid 15 milliLiter(s) Oral Mucosa every 12 hours  chlorhexidine 2% Cloths 1 Application(s) Topical <User Schedule>        Mode: AC/ CMV (Assist Control/ Continuous Mandatory Ventilation)  RR (machine): 32  TV (machine): 340  FiO2: 60  PEEP: 12  MAP: 21  PIP: 29      ICU Vital Signs Last 24 Hrs  T(C): 37.8 (10 Dec 2020 12:00), Max: 39.3 (10 Dec 2020 08:00)  T(F): 100.1 (10 Dec 2020 12:00), Max: 102.7 (10 Dec 2020 08:00)  HR: 71 (10 Dec 2020 13:20) (65 - 77)  BP: 101/57 (10 Dec 2020 08:00) (101/57 - 101/57)  BP(mean): 71 (10 Dec 2020 08:00) (71 - 71)  ABP: 113/50 (10 Dec 2020 14:15) (94/49 - 127/54)  ABP(mean): 67 (10 Dec 2020 14:15) (66 - 80)  RR: 32 (10 Dec 2020 13:00) (16 - 36)  SpO2: 99% (10 Dec 2020 13:20) (91% - 100%)      ABG - ( 10 Dec 2020 04:58 )  pH, Arterial: 7.38  pH, Blood: x     /  pCO2: 51    /  pO2: 71    / HCO3: 28    / Base Excess: 4.6   /  SaO2: 94                  I&O's Detail    09 Dec 2020 07:01  -  10 Dec 2020 07:00  --------------------------------------------------------  IN:    FentaNYL: 459.2 mL    IV PiggyBack: 250 mL    IV PiggyBack: 500 mL    IV PiggyBack: 250 mL    Norepinephrine: 296.8 mL    Propofol: 541.3 mL  Total IN: 2297.3 mL    OUT:    Indwelling Catheter - Urethral (mL): 648 mL    Nasogastric/Oral tube (mL): 400 mL  Total OUT: 1048 mL    Total NET: 1249.3 mL      10 Dec 2020 07:01  -  10 Dec 2020 14:25  --------------------------------------------------------  IN:    FentaNYL: 24.6 mL    FentaNYL: 147.6 mL    Midazolam: 8.2 mL    Midazolam: 55.6 mL    Norepinephrine: 7.7 mL    Norepinephrine: 68.4 mL    Propofol: 93.4 mL    Propofol: 19.7 mL  Total IN: 425.2 mL    OUT:    Indwelling Catheter - Urethral (mL): 230 mL  Total OUT: 230 mL    Total NET: 195.2 mL            LABS:                        11.9   31.07 )-----------( 149      ( 10 Dec 2020 05:37 )             35.2     12-10    141  |  100  |  46.0<H>  ----------------------------<  179<H>  4.3   |  27.0  |  2.29<H>    Ca    7.7<L>      10 Dec 2020 05:37  Phos  5.2     12-10  Mg     2.5     12-10    TPro  5.8<L>  /  Alb  2.2<L>  /  TBili  0.5  /  DBili  x   /  AST  23  /  ALT  17  /  AlkPhos  108  12-10          CAPILLARY BLOOD GLUCOSE      POCT Blood Glucose.: 206 mg/dL (10 Dec 2020 11:12)        CULTURES:      Physical Examination:    General: No acute distress intubated and sedated proned      HEENT:   et and og    in place      PULM: Clear to auscultation bilaterally, no significant sputum production    CVS: Regular rate and rhythm, no murmurs, rubs, or gallops    ABD: Soft, nondistended, nontender, normoactive bowel sounds, no masses    EXT: No edema, nontender    SKIN: Warm and well perfused, no rashes noted.        DEVICES:   sherine   central  et ng spring    assesment and plan   59 y/o obese male with pmhx of HTN, HLD admitted on 12/5 with COVID-19 with hospital course complicated by bilateral LE DVT started on eliquis admitted on HFNC, with worsening hypoxia on 100% HFNC and 100% NRB.    MICU consult called for patient desatting to 40s. He repeatedly is taking off his supplemental o2 and not tolerating bipap. Redirected multiple times in [place]ian as well as had his son attempt to calm him down. Started on precedex to trial bipap and avoid intubation but patients sats only improved to 73% at best and he repeatedly pulled it off while on max precedex desatting to low 40s. Emergently intubated.     1- hypoxic resp failure  2- covid   3- htn  4- obese  5-OSEAS    neurologic   sedated   fentanyl versed  propofol   resp  proning    repeat abg    titrate down oxygen keep sats > 90 %  ID   full dose  a/c heparin /  remdesevir / decadron 20 mg QD x  5 days or if improvement decrease to 6 mg IVP     endocrine   insulin drip   heme  follow  up d dimer    hb   gi  PPI    > 35 minutes critical care - monitoring for decompensation --  co-ordination of care   labs/ imaging review and discussions with family  exclusive of time  spent on procedures -    high probability of imminent of life threatening deterioration

## 2020-12-10 NOTE — DIETITIAN INITIAL EVALUATION ADULT. - PERTINENT MEDS FT
MEDICATIONS  (STANDING):  artificial  tears Solution 1 Drop(s) Both EYES two times a day  ascorbic acid 1000 milliGRAM(s) Oral daily  benzonatate 100 milliGRAM(s) Oral three times a day  chlorhexidine 0.12% Liquid 15 milliLiter(s) Oral Mucosa every 12 hours  chlorhexidine 2% Cloths 1 Application(s) Topical <User Schedule>  dexAMETHasone  IVPB 20 milliGRAM(s) IV Intermittent daily  dextrose 50% Injectable 25 Gram(s) IV Push once  dextrose 50% Injectable 12.5 Gram(s) IV Push once  dextrose 50% Injectable 25 Gram(s) IV Push once  fentaNYL   Infusion 3 MICROgram(s)/kG/Hr (24.6 mL/Hr) IV Continuous <Continuous>  heparin  Infusion.  Unit(s)/Hr (15 mL/Hr) IV Continuous <Continuous>  insulin lispro (ADMELOG) corrective regimen sliding scale   SubCutaneous every 6 hours  midazolam Infusion 0.1 mG/kG/Hr (8.19 mL/Hr) IV Continuous <Continuous>  norepinephrine Infusion 0.05 MICROgram(s)/kG/Min (7.68 mL/Hr) IV Continuous <Continuous>  pantoprazole   Suspension 40 milliGRAM(s) Oral daily  propofol Infusion 40 MICROgram(s)/kG/Min (19.7 mL/Hr) IV Continuous <Continuous>  thiamine 100 milliGRAM(s) Oral daily  zinc sulfate 220 milliGRAM(s) Oral daily    MEDICATIONS  (PRN):  acetaminophen   Tablet .. 650 milliGRAM(s) Oral every 6 hours PRN Mild Pain (1 - 3)  acetaminophen  Suppository .. 650 milliGRAM(s) Rectal every 6 hours PRN Temp greater or equal to 38.5C (101.3F)  ALBUTerol    90 MICROgram(s) HFA Inhaler 2 Puff(s) Inhalation every 6 hours PRN Shortness of Breath and/or Wheezing  fentaNYL    Injectable 50 MICROGram(s) IV Push every 4 hours PRN vent dyssynchrony  heparin   Injectable 6500 Unit(s) IV Push every 6 hours PRN For aPTT less than 40  heparin   Injectable 3000 Unit(s) IV Push every 6 hours PRN For aPTT between 40 - 57

## 2020-12-11 NOTE — PROGRESS NOTE ADULT - SUBJECTIVE AND OBJECTIVE BOX
On Admission  12-05-20 (6d)  HPI:  A 61 yo male w/ pmhx of HLD, HTN, DM presents to the ED w/ complaints of SOB and dyspnea. The patient lives at home w/ his wife and kids. The patient was recently around his children who were positive w/ covid, at home the patient noticed at rest he was having dyspnea w/o minimal exertion.          In the ED the patient was noted to be hypoxic, SPO2 57% on RA and RR was 34. The patient was placed on on Bi-pap and SPO2 improved to 98%. EKG performed showed evidence of Sinus bradycardia. (05 Dec 2020 07:18)    PAST MEDICAL & SURGICAL HISTORY:  Hyperlipidemia        Antimicrobial:    Cardiovascular:  norepinephrine Infusion 0.05 MICROgram(s)/kG/Min IV Continuous <Continuous>    Pulmonary:  ALBUTerol    90 MICROgram(s) HFA Inhaler 2 Puff(s) Inhalation every 6 hours PRN  benzonatate 100 milliGRAM(s) Oral three times a day    Hematalogic:  heparin   Injectable 6500 Unit(s) IV Push every 6 hours PRN  heparin   Injectable 3000 Unit(s) IV Push every 6 hours PRN  heparin  Infusion.  Unit(s)/Hr IV Continuous <Continuous>    Other:  acetaminophen   Tablet .. 650 milliGRAM(s) Oral every 6 hours PRN  acetaminophen  Suppository .. 650 milliGRAM(s) Rectal every 6 hours PRN  artificial  tears Solution 1 Drop(s) Both EYES two times a day  ascorbic acid 1000 milliGRAM(s) Oral daily  chlorhexidine 0.12% Liquid 15 milliLiter(s) Oral Mucosa every 12 hours  chlorhexidine 2% Cloths 1 Application(s) Topical <User Schedule>  dexAMETHasone  IVPB 20 milliGRAM(s) IV Intermittent daily  dextrose 50% Injectable 25 Gram(s) IV Push once  dextrose 50% Injectable 12.5 Gram(s) IV Push once  dextrose 50% Injectable 25 Gram(s) IV Push once  fentaNYL    Injectable 50 MICROGram(s) IV Push every 4 hours PRN  fentaNYL   Infusion 3 MICROgram(s)/kG/Hr IV Continuous <Continuous>  insulin lispro (ADMELOG) corrective regimen sliding scale   SubCutaneous every 6 hours  midazolam Infusion 0.1 mG/kG/Hr IV Continuous <Continuous>  pantoprazole   Suspension 40 milliGRAM(s) Oral daily  propofol Infusion 40 MICROgram(s)/kG/Min IV Continuous <Continuous>  thiamine 100 milliGRAM(s) Oral daily  zinc sulfate 220 milliGRAM(s) Oral daily      Drug Dosing Weight  Height (cm): 165.1 (05 Dec 2020 22:47)  Weight (kg): 81.9 (05 Dec 2020 22:47)  BMI (kg/m2): 30 (05 Dec 2020 22:47)  BSA (m2): 1.89 (05 Dec 2020 22:47)    T(C): 37.1 (12-10-20 @ 20:10), Max: 39.3 (12-10-20 @ 08:00)  HR: 71 (12-11-20 @ 04:00)  BP: 101/57 (12-10-20 @ 08:00)  BP(mean): 71 (12-10-20 @ 08:00)  ABP: 105/46 (12-11-20 @ 04:00)  ABP(mean): 62 (12-11-20 @ 04:00)  RR: 32 (12-11-20 @ 04:00)  SpO2: 95% (12-11-20 @ 04:00)    ABG - ( 10 Dec 2020 21:01 )  pH, Arterial: 7.33  pH, Blood: x     /  pCO2: 53    /  pO2: 167   / HCO3: 26    / Base Excess: 1.1   /  SaO2: 100                   12-09 @ 07:01  -  12-10 @ 07:00  --------------------------------------------------------  IN: 2297.3 mL / OUT: 1048 mL / NET: 1249.3 mL        Mode: AC/ CMV (Assist Control/ Continuous Mandatory Ventilation)  RR (machine): 32  TV (machine): 340  FiO2: 50  PEEP: 10  MAP: 17  PIP: 30        LABS:  CBC Full  -  ( 11 Dec 2020 03:53 )  WBC Count : 22.32 K/uL  RBC Count : 3.52 M/uL  Hemoglobin : 11.2 g/dL  Hematocrit : 33.9 %  Platelet Count - Automated : 170 K/uL  Mean Cell Volume : 96.3 fl  Mean Cell Hemoglobin : 31.8 pg  Mean Cell Hemoglobin Concentration : 33.0 gm/dL  Auto Neutrophil # : x  Auto Lymphocyte # : x  Auto Monocyte # : x  Auto Eosinophil # : x  Auto Basophil # : x  Auto Neutrophil % : x  Auto Lymphocyte % : x  Auto Monocyte % : x  Auto Eosinophil % : x  Auto Basophil % : x    12-11    144  |  105  |  73.0<H>  ----------------------------<  197<H>  4.3   |  25.0  |  3.89<H>    Ca    7.7<L>      11 Dec 2020 03:53  Phos  7.3     12-11  Mg     3.1     12-11    TPro  5.9<L>  /  Alb  2.0<L>  /  TBili  0.5  /  DBili  x   /  AST  18  /  ALT  17  /  AlkPhos  110  12-11    PTT - ( 11 Dec 2020 00:41 )  PTT:57.7 sec      ____________________________________________________________________________________________________

## 2020-12-11 NOTE — PROGRESS NOTE ADULT - ASSESSMENT
HPI/INTERVAL EVENTS: no major events    EXAM:   intubated and sedated on prop/versed/fentanyl  facial edema  PERRL  reg rhythm  bilat air entry  abd soft  mild LE edema    Lines:  R IJ TLC  R axillary art line     RADIOLOGY REVIEWED:  cxr bilat airspace and interstitial opacities    IMPRESSION/ASSESSMENT & PLAN:   59 yo male with DM, HTN, HLD, admitted 12/5 with covid19 viral pneumonia, developed acute respiratory failure, ARDS, bilat LE DVTs, OSEAS, septic shock.    Acute respiratory failure/ covid19  - on dexamethasone  - completed remdesivir  - ardsnet low Vt ventilation, no longer requiring prone position   current vent settings: 32/340/50%/10 cmH2o    LE DVT  - full dose heparin    OSEAS  still making urine  may progress to needing renal replacement therapy     Diet: start tube feeds

## 2020-12-11 NOTE — PROGRESS NOTE ADULT - ASSESSMENT
1) ATN  2) Oliguria  3) Acidosis  4) COVID19    Will repeat labs;  Pt still making urine;   Holding off on RRT at this time;  May progress and worsen necessitating renal repalcement therapy by tomorrow  Discussed with MICU team in detail

## 2020-12-12 NOTE — PROGRESS NOTE ADULT - SUBJECTIVE AND OBJECTIVE BOX
Patient severely worsened over course of today. On arrival to my shift patient was proned, and SPO2 was in the low 80s. Peak pressure 38, plateau pressure 32, 35/420/100/10 on vent. Attempted recruitment with 30 PEEP for 30 sec with minimal improvement in O2. He is on propofol, fentnayl, versed and nimbex infusions along with levophed. He is also hypotensive to 80s with MAP in high 50s. He has developed worsening renal failure and nephrology has placed ordered for CRRT. He was given 3 amps of bicarb with some improvement in BP. He has no access for CRRT at this time and given he is proned, he needed to be flipped back and left femoral acces was placed. While supined his SPO2 dropped to 60s, he was BVMed and SPo2 increased to 80s. He was then reproned. BP continues to drop despite increasing doses of karen and the addition of vasopressin. Will attempt to give more bicarbonate and start bicarb infusion, attempting to get CRRT up and running. The prognosis of this patient is extremely poor he is in a cannot oxygenate and cannot ventilate situation despite aggressive efforts. His son Richard was called and updated on patient's clinical condition. I explained to him that his father has had a very rough day and he may not be able to make it through the night. His son was confused by this because he had not gotten an update since yesterday and at this time his oxygen was at 50%. I explained to him that if his heart were to stop CPR would not benefit the patient as he is already on maximum medical therapy. His son could not make any decisions like that at this time. CXR with worsening b/l infiltrates, no PTX.     The patient's condition was discussed with Dr. Bañuelos multiple times throughout the night, his prognosis is extremely poor. He has had prior DVT study that is positive, and is on a heparin gtt. It is possible that he has severe pulmonary embolism causing this sudden decompensation, we may need to try tPA, but as a last resort.

## 2020-12-12 NOTE — PROCEDURE NOTE - NSINFORMCONSENT_GEN_A_CORE
This was an emergent procedure.
Yesika

## 2020-12-12 NOTE — PROGRESS NOTE ADULT - SUBJECTIVE AND OBJECTIVE BOX
HPI: 59 yo male, PMHx HTN, HLD, T2DM, admitted with COVID-19 viral pneumonia, complicated by acute hypoxemic respiratory failure, ARDS, distributive shock, ATN    Hosp day #8  Intubated 12/9  p:F ratio = 67        Sedated on Propofol, Fentanyl, Versed gtts   Desynchronous with vent, hypoxic to mid-80's on FiO2 70%/+10, increased to 100%/+12 limited by high Ppeak 36-38 Pplat 26  Paralyzed with Yoel x 2, remained hypoxic ultimately started on continuous paralytic and proned at 12:00  Developed AFib RVR with associated hypotension, changed from Norepinephrine to Phenylephrine, and cardioverted 200J with improvement in HR/BP/oxygenation  ABG post-paralysis and proning with severe respiratory acidosis, RR increased from 32 --> 35 and Tv from 340 --> 350  Oliguric ATN, UOP ~30 cc/hr  Low grade fevers          Vital signs reviewed and physical exam performed where pertinent and urgently required.    Lab/radiology studies/ABG/meds reviewed and interpreted into the assessment and treatment plan.        Assessment/Plan/Therapeutic interventions:    Neuro: Deep sedation and neuromuscular blockade as needed to facilitate ventilation and improve compliance to optimize oxygenation    CV: Vasopressor support titrating to maintain MAP >65. Monitoring end points of perfusion.    Pulm: Low Tv lung protective strategy 4-8 cc/kg IBW. Manipulating vent to maintain paO2 55-80 and pH >7.15 with permissive hypercapnea. Prone ventilation strategies in place, to be supinated after 16 hours pending clinical course. Repeat ABG in 1 hour post-vent adjustments and will continue to augment from there.    GI: GI stress ulcer ppx. Enteric feeds on hold while paralyzed/proned and unstable. Check TG in AM while on Propofol gtt.    Renal: Monitoring renal indices and fluid status, OSEAS worsening, d/w Nephrology giving fluid challenge. May need CRRT    Heme: Heparin gtt for below knee DVT on right    ID:     Endo: Aggressive glycemic control to keep FS glucose to <180mg/dl while critically ill.           COVID 19 specific considerations and therapeutic options based on the available and rapidly changing literature.    47 minutes of critical care time spent in the management of this critically ill COVID-19 patient suffering from multisystem organ failure with continuous assessments and interventions based on the interpretation of multiple databases. Critical care time not inclusive of independent time spent on procedures performed. HPI: 59 yo male, PMHx HTN, HLD, T2DM, admitted with COVID-19 viral pneumonia, complicated by acute hypoxemic respiratory failure, ARDS, distributive shock, ATN    Hosp day #8  Intubated 12/9  p:F ratio = 67        Sedated on Propofol, Fentanyl, Versed gtts   Desynchronous with vent, hypoxic to mid-80's on FiO2 70%/+10, increased to 100%/+12 limited by high Ppeak 36-38 Pplat 26  Paralyzed with Yoel x 2, remained hypoxic ultimately started on continuous paralytic and proned at 12:00  Developed AFib RVR with associated hypotension, changed from Norepinephrine to Phenylephrine, and cardioverted 200J with improvement in HR/BP/oxygenation  ABG post-paralysis and proning with severe respiratory acidosis, RR increased from 32 --> 35 and Tv from 340 --> 350  Oliguric ATN, UOP ~30 cc/hr  Low grade fevers          Vital signs reviewed and physical exam performed where pertinent and urgently required.    Lab/radiology studies/ABG/meds reviewed and interpreted into the assessment and treatment plan.        Assessment/Plan/Therapeutic interventions:    Neuro: Deep sedation and neuromuscular blockade as needed to facilitate ventilation and improve compliance to optimize oxygenation    CV: Vasopressor support changed to Phenylephrine for tachyarrhythmia associated with shock, titrating to maintain MAP >65. Monitoring end points of perfusion.    Pulm: Low Tv lung protective strategy 4-8 cc/kg IBW. Manipulating vent to maintain paO2 55-80 and pH >7.15 with permissive hypercapnea. Prone ventilation strategies in place, to be supinated after 16 hours pending clinical course. Repeat ABG in 1 hour post-vent adjustments and will continue to augment from there.    GI: GI stress ulcer ppx. Enteric feeds on hold while paralyzed/proned and unstable. Check TG in AM while on Propofol gtt.    Renal: Monitoring renal indices and fluid status, OSEAS worsening, d/w Nephrology giving fluid challenge. May need CRRT    Heme: Heparin gtt for below knee DVT on right    ID: Remains febrile with leukocytosis, given worsening clinical condition start empiric antibiotics for possible superimposed bacterial pneumonia CAP vs HAP, Zosyn and Vancomycin renally adjusted. Send blood and sputum cx, check RVP for atypical pneumonia, Legionella     Endo: Hyperglycemic, start insulin gtt for aggressive glycemic control to keep FS glucose to <180mg/dl while critically ill.           COVID 19 specific considerations and therapeutic options based on the available and rapidly changing literature.    70 minutes of critical care time spent in the management of this critically ill COVID-19 patient suffering from multisystem organ failure with continuous assessments and interventions based on the interpretation of multiple databases. Critical care time not inclusive of independent time spent on procedures performed.

## 2020-12-12 NOTE — PROCEDURE NOTE - NSPOSTCAREGUIDE_GEN_A_CORE
Care for catheter as per unit/ICU protocols
Instructed patient/caregiver regarding signs and symptoms of infection/Care for catheter as per unit/ICU protocols/Verbal/written post procedure instructions were given to patient/caregiver/Instructed patient/caregiver to follow-up with primary care physician/Keep the cast/splint/dressing clean and dry

## 2020-12-12 NOTE — PROVIDER CONTACT NOTE (EICU) - BACKGROUND
ICU PA called for refractory hypoxic and hypercapneic resp failure  Pt was already paralyzed, proned, on three pressors and continuous RRT  + DVT  7.19/73/49/79  Plt 198

## 2020-12-12 NOTE — PROCEDURE NOTE - PROCEDURE DATE TIME, MLM
09-Dec-2020 13:20
12-Dec-2020 14:28
09-Dec-2020 01:09
09-Dec-2020 03:11
09-Dec-2020 02:37
09-Dec-2020 03:07
13-Dec-2020 01:54

## 2020-12-12 NOTE — PROVIDER CONTACT NOTE (EICU) - ASSESSMENT
Plan  1. Please consult CTS if pt is a candidate of VV ECMO support  2. Given PTT was subtherapeutic, and pt has DVT, pre-test prob of massive PE was not low.  If pt has no signs of bleeding and he is already in kacey-arrest, can give tPA (50mg bolus, followed by 50mg infusion in one hour).  3. If pt is not a VV ECMO candidate, mostly likely, he will not survive.  ICU PA spoke with pt's family, and they are coming to see patient

## 2020-12-12 NOTE — PROCEDURE NOTE - ADDITIONAL PROCEDURE DETAILS
initial NGT place coiled around ETT. removed and new one placed under glidescope guidance.    procedure performed independent of documented critical care time
diagnosis: ARDS    procedure performed independent of documented critical care time  under ultrasound appropriate anatomy visualized. needle visualized entering appropriate vessel. wire imaged in vessel. images obtained.
procedure performed independent of documented critical care time    diagnosis: ARDS
ARF: N17. 9
procedure performed independent of documented critical care time

## 2020-12-12 NOTE — PROCEDURE NOTE - NSPROCDETAILS_GEN_ALL_CORE
orogastric
patient pre-oxygenated, tube inserted, placement confirmed/connected to ventilator
guidewire recovered/lumen(s) aspirated and flushed/ultrasound guidance/sterile technique, catheter placed/sterile dressing applied
ultrasound guidance with use of sterile gel and probe cove/sterile dressing applied/sterile technique, catheter placed/guidewire recovered/lumen(s) aspirated and flushed
positive blood return obtained via catheter/sutured in place/location identified, draped/prepped, sterile technique used, needle inserted/introduced/connected to a pressurized flush line/hemostasis with direct pressure, dressing applied/ultrasound guidance/Seldinger technique/all materials/supplies accounted for at end of procedure

## 2020-12-12 NOTE — PROCEDURE NOTE - NSINDICATIONS_GEN_A_CORE
hypotension/SVT/arrhythmia/hemodynamic instability
respiratory failure
feeds
mental status change/respiratory failure/critical patient
hypertonic/irritant infusion/venous access/critical illness
monitoring purposes/blood sampling/critical patient
dialysis/CRRT

## 2020-12-12 NOTE — PROGRESS NOTE ADULT - SUBJECTIVE AND OBJECTIVE BOX
ICU Vital Signs Last 24 Hrs,    T(C): 38.3 (12 Dec 2020 16:20), Max: 38.4 (12 Dec 2020 05:00)  T(F): 100.9 (12 Dec 2020 16:20), Max: 101.1 (12 Dec 2020 05:00)  HR: 95 (12 Dec 2020 17:00) (61 - 182)  BP: 106/58 (11 Dec 2020 20:00) (106/58 - 106/58)  BP(mean): 69 (11 Dec 2020 20:00) (69 - 69)  ABP: 109/63 (12 Dec 2020 17:00) (90/50 - 116/47)  ABP(mean): 62 (12 Dec 2020 17:00) (55 - 72)  RR: 32 (12 Dec 2020 17:00) (32 - 35)  SpO2: 91% (12 Dec 2020 17:00) (75% - 95%)    144    |  109<H>  |  102.0<H>  ----------------------------<  207<H>  Ca:6.9<L> (12 Dec 2020 15:34)  4.1     |  23.0   |  4.39<H>    eGFR if Non : 14 <L>  eGFR if : 16 <L>    TPro  6.3<L>  /  Alb  1.8<L>  /  TBili  0.7    /  DBili  x      /  AST  28     /  ALT  20     /  AlkPhos  139<H>  12 Dec 2020 03:30                              10.5<L>  17.83<H> )-----------( 198      ( 12 Dec 2020 03:30 )                   31.7<L>    Phos:7.7 mg/dL<H> M.9 mg/dL<H> ( @ 15:34)    MAXIME Bañuelos,    Will begin  CVVHDF,    Box 4. Summary of KDIGO Recommendation Statements: Dialysis Interventions for Treatment of OSEAS    5.1.1: Initiate RRT emergently when life-threatening changes in fluid, electrolyte, and acid-base balance exist. (Not Graded)  5.1.2: Consider the broader clinical context, the presence of conditions that can be modified with RRT, and trends of laboratory  tests—rather than single BUN and creatinine thresholds alone—when making the decision to start RRT. (Not Graded)  5.2.1: Discontinue RRT when it is no longer required, either because intrinsic kidney function has recovered to the point that it is  adequate to meet patient needs, or because RRT is no longer consistent with the goals of care. (Not Graded)  5.2.2: We suggest not using diuretics to enhance kidney function recovery, or to reduce the duration or frequency of RRT. (2B)  5.3.1: In a patient with OSEAS requiring RRT, base the decision to use anticoagulation for RRT on assessment of the patient’s potential  risks and benefits from anticoagulation (see Figure 17). (Not Graded)  5.3.1.1: We recommend using anticoagulation during RRT in OSEAS if a patient does not have an increased bleeding risk or  impaired coagulation and is not already receiving systemic anticoagulation. (1B)  5.3.2: For patients without an increased bleeding risk or impaired coagulation and not already receiving effective systemic  anticoagulation, we suggest the followin.3.2.1: For anticoagulation in intermittent RRT, we recommend using either unfractionated or low-molecular-weight heparin,  rather than other anticoagulants. (1C)  5.3.2.2: For anticoagulation in CRRT, we suggest using regional citrate anticoagulation rather than heparin in patients who do  not have contraindications for citrate. (2B)  5.3.2.3: For anticoagulation during CRRT in patients who have contraindications for citrate, we suggest using either  unfractionated or low-molecular-weight heparin, rather than other anticoagulants. (2C)  5.3.3: For patients with increased bleeding risk who are not receiving anticoagulation, we suggest the following for anticoagulation  during RRT:  5.3.3.1: We suggest using regional citrate anticoagulation, rather than no anticoagulation, during CRRT in a patient without  contraindications for citrate. (2C)  5.3.3.2: We suggest avoiding regional heparinization during CRRT in a patient with increased risk of bleeding. (2C)  5.3.4: In a patient with heparin-induced thrombocytopenia (HIT), all heparin must be stopped and we recommend using direct  thrombin inhibitors (such as argatroban) or Factor Xa inhibitors (such as danaparoid or fondaparinux) rather than other or no  anticoagulation during RRT. (1A)  5.3.4.1: In a patient with HIT who does not have severe liver failure, we suggest using argatroban rather than other thrombin  or Factor Xa inhibitors during RRT. (2C)  5.4.1: We suggest initiating RRT in patients with OESAS via an uncuffed nontunneled dialysis catheter, rather than a tunneled catheter.(2D)  5.4.2: When choosing a vein for insertion of a dialysis catheter in patients with OSEAS, consider these preferences (Not Graded):  ? First choice: right jugular vein;  ? Second choice: femoral vein;  ? Third choice: left jugular vein;  ? Last choice: subclavian vein with preference for the dominant side.  5.4.3: We recommend using ultrasound guidance for dialysis catheter insertion. (1A)  5.4.4: We recommend obtaining a chest radiograph promptly after placement and before first use of an internal jugular or subclavian  dialysis catheter. (1B)  5.4.5: We suggest not using topical antibiotics over the skin insertion site of a nontunneled dialysis catheter in ICU patients with OSEAS  requiring RRT. (2C)  5.4.6: We suggest not using antibiotic locks for prevention of catheter-related infections of nontunneled dialysis catheters in OSEAS  requiring RRT. (2C)  5.5.1: We suggest to use dialyzers with a biocompatible membrane for IHD and CRRT in patients with OSEAS. (2C)  5.6.1: Use continuous and intermittent RRT as complementary therapies in OSEAS patients. (Not Graded)  5.6.2: We suggest using CRRT, rather than standard intermittent RRT, for hemodynamically unstable patients. (2B)  5.6.3: We suggest using CRRT, rather than intermittent RRT, for OSEAS patients with acute brain injury or other causes of increased  intracranial pressure or generalized brain edema. (2B)  5.7.1: We suggest using bicarbonate, rather than lactate, as a buffer in dialysate and replacement fluid for RRT in patients with OSEAS. (2C)  5.7.2: We recommend using bicarbonate, rather than lactate, as a buffer in dialysate and replacement fluid for RRT in patients with  OSEAS and circulatory shock. (1B)  5.7.3: We suggest using bicarbonate, rather than lactate, as a buffer in dialysate and replacement fluid for RRT in patients with OSEAS  and liver failure and/or lactic acidemia. (2B)  5.7.4: We recommend that dialysis fluids and replacement fluids in patients with OSEAS, at a minimum, comply with American  Association of Medical Instrumentation (AAMI) standards regarding contamination with bacteria and endotoxins. (1B)  5.8.1: The dose of RRT to be delivered should be prescribed before starting each session of RRT. (Not Graded) We recommend  frequent assessment of the actual delivered dose in order to adjust the prescription. (1B)  5.8.2: Provide RRT to achieve the goals of electrolyte, acid-base, solute, and fluid balance that will meet the patient’s needs. (Not  Graded)  5.8.3: We recommend delivering a Kt/V of 3.9 per week when using intermittent or extended RRT in OSEAS. (1A)  5.8.4: We recommend delivering an effluent volume of 20-25 mL/kg/h for CRRT in OSEAS (1A). This will usually require a higher  prescription of effluent volume. (Not Graded)  Abbreviations: OSEAS, acute kidney injury; BUN, blood urea nitrogen; CRRT, continuous renal replacement therapy; ICU, intensive  care unit; RRT, renal replacement therapy.    Reproduced with permission of KDIGO from the KDIGO Clinical Practice Guideline for Acute Kidney Injury.    Plan : Even fluid Balance overnite,     Reassess in 24 hours,

## 2020-12-13 NOTE — PROGRESS NOTE ADULT - SUBJECTIVE AND OBJECTIVE BOX
[facility] DIVISION OF KIDNEY DISEASES AND HYPERTENSION -- FOLLOW UP NOTE  --------------------------------------------------------------------------------  Chief Complaint: Doing very poorly , Per EMR & Marie Bañuelos,    24 hour events/subjective:    PAST HISTORY  --------------------------------------------------------------------------------  No significant changes to PMH, PSH, FHx, SHx, unless otherwise noted    ALLERGIES & MEDICATIONS  --------------------------------------------------------------------------------  Allergies    No Known Allergies    Standing Inpatient Medications  aMIOdarone Infusion 1 mG/Min IV Continuous <Continuous>  artificial  tears Solution 1 Drop(s) Both EYES two times a day  ascorbic acid 1000 milliGRAM(s) Oral daily  benzonatate 100 milliGRAM(s) Oral three times a day  chlorhexidine 0.12% Liquid 15 milliLiter(s) Oral Mucosa every 12 hours  chlorhexidine 2% Cloths 1 Application(s) Topical <User Schedule>  cisatracurium Infusion 3 MICROgram(s)/kG/Min IV Continuous <Continuous>  dextrose 50% Injectable 25 Gram(s) IV Push once  dextrose 50% Injectable 12.5 Gram(s) IV Push once  dextrose 50% Injectable 25 Gram(s) IV Push once  fentaNYL   Infusion 3 MICROgram(s)/kG/Hr IV Continuous <Continuous>  midazolam Infusion 0.1 mG/kG/Hr IV Continuous <Continuous>  pantoprazole   Suspension 40 milliGRAM(s) Oral daily  phenylephrine    Infusion 3 MICROgram(s)/kG/Min IV Continuous <Continuous>  piperacillin/tazobactam IVPB.. 3.375 Gram(s) IV Intermittent every 12 hours  sodium bicarbonate  Infusion 0.137 mEq/kG/Hr IV Continuous <Continuous>  thiamine 100 milliGRAM(s) Oral daily  vasopressin Infusion 0.04 Unit(s)/Min IV Continuous <Continuous>  zinc sulfate 220 milliGRAM(s) Oral daily    PRN Inpatient Medications  acetaminophen   Tablet .. 650 milliGRAM(s) Oral every 6 hours PRN  acetaminophen  Suppository .. 650 milliGRAM(s) Rectal every 6 hours PRN  ALBUTerol    90 MICROgram(s) HFA Inhaler 2 Puff(s) Inhalation every 6 hours PRN  fentaNYL    Injectable 50 MICROGram(s) IV Push every 4 hours PRN    REVIEW OF SYSTEMS : NA,  ------------------------------------------------------------------------------  VITALS/PHYSICAL EXAM  --------------------------------------------------------------------------------  T(C): 36.7 (12-13-20 @ 08:00), Max: 38.3 (12-12-20 @ 16:20)  HR: 0 (12-13-20 @ 10:30) (0 - 160)  BP: 73/45 (12-13-20 @ 08:00) (73/45 - 73/45)  RR: 35 (12-13-20 @ 10:30) (30 - 36)  SpO2: 50% (12-13-20 @ 03:00) (50% - 92%)    Weight (kg): 82 (12-12-20 @ 17:53)    12-12-20 @ 07:01  -  12-13-20 @ 07:00  --------------------------------------------------------  IN: 5534.9 mL / OUT: 530 mL / NET: 5004.9 mL    12-13-20 @ 07:01  -  12-13-20 @ 12:25  --------------------------------------------------------  IN: 757.8 mL / OUT: 10 mL / NET: 747.8 mL    Physical Exam: Per Critical care Attending & PA,   	  LABS/STUDIES  --------------------------------------------------------------------------------              10.5   17.83 >-----------<  198      [12-12-20 @ 03:30]              31.7     149  |  104  |  110.0  ----------------------------<  112      [12-13-20 @ 05:40]  4.0   |  23.0  |  5.71        Ca     6.5     [12-13-20 @ 05:40]      Mg     3.1     [12-13-20 @ 05:40]      Phos  11.4     [12-13-20 @ 05:40]    TPro  4.9  /  Alb  1.3  /  TBili  1.2  /  DBili  1.0  /  AST  50  /  ALT  19  /  AlkPhos  75  [12-13-20 @ 05:40]    PTT: 27.4       [12-12-20 @ 21:27]    Creatinine Trend:  SCr 5.71 [12-13 @ 05:40]  SCr 4.39 [12-12 @ 15:34]  SCr 4.37 [12-12 @ 03:30]  SCr 4.36 [12-11 @ 18:21]  SCr 3.89 [12-11 @ 03:53]    Ferritin 774      [12-09-20 @ 04:46]  Lipid: chol --, , HDL --, LDL --      [12-13-20 @ 05:40]    HCV 0.53, Nonreact      [12-06-20 @ 11:18]

## 2020-12-13 NOTE — CHART NOTE - NSCHARTNOTEFT_GEN_A_CORE
Spoke w/ pt's son, Nayan. Alerted him that pt's prognosis is extremely poor. Despite our maximum efforts, we are unable to adequately oxygenate/ventilate him. Family agreed to DNR/comfort care. Asked to be present at bedside.    Discussed w/ ICU attending, Dr. Bañuelos.

## 2020-12-13 NOTE — PROGRESS NOTE ADULT - REASON FOR ADMISSION
COVID Hypoxia

## 2020-12-13 NOTE — CHART NOTE - NSCHARTNOTEFT_GEN_A_CORE
Contacted by MICU STEPHANIE Branch for potential VV-ECMO on patient given worsening ABG with O2 sat 60-80% and PO2 49 on maximally optimized ventilatory support and post-proning. Discussed with on-call surgeon Dr. Angulo who also spoke directly to MICU provider. At this point as per Dr. Angulo, recommend obtaining formal VV-ECMO consultation via [facility] VV-ECMO program at [address] [place] [address]. This phone number was provided to MICU provider.

## 2020-12-13 NOTE — DISCHARGE NOTE FOR THE EXPIRED PATIENT - HOSPITAL COURSE
61 y/o M w/ a PMHx of HTN, HLD, and type 2 DM who was admitted on 12/5 w/ acute hypoxic respiratory failure 2/2 COVID-19 PNA. Course complicated by worsening hypoxic respiratory failure/ARDS requiring intubation on 12/9, distributive shock, acute renal failure, and SVT. Over the past 24 hrs, pt severely worsened. VV ECMO consult placed overnight, which was denied. AM ABG revealing of PaO2 38 and O2 sat 56%. Pt remained proned/paralyzed, on Levophed gtt, and on amiodarone gtt. Family contacted this AM to discussed goals of care given pt's grim prognosis. Pt made DNR/comfort care. All medications aside from sedation were discontinued. Alerted by RN that pt was asystolic on the monitor. A line w/o wave form, pupils fixed and dilated, no palpable pulses, absent heart sounds. Pt passed away at 10:30 w/ family present at bedside.

## 2020-12-14 LAB
-  AMIKACIN: SIGNIFICANT CHANGE UP
-  AMIKACIN: SIGNIFICANT CHANGE UP
-  AMOXICILLIN/CLAVULANIC ACID: SIGNIFICANT CHANGE UP
-  AMOXICILLIN/CLAVULANIC ACID: SIGNIFICANT CHANGE UP
-  AMPICILLIN/SULBACTAM: SIGNIFICANT CHANGE UP
-  AMPICILLIN: SIGNIFICANT CHANGE UP
-  AMPICILLIN: SIGNIFICANT CHANGE UP
-  AZTREONAM: SIGNIFICANT CHANGE UP
-  AZTREONAM: SIGNIFICANT CHANGE UP
-  CEFAZOLIN: SIGNIFICANT CHANGE UP
-  CEFEPIME: SIGNIFICANT CHANGE UP
-  CEFEPIME: SIGNIFICANT CHANGE UP
-  CEFOXITIN: SIGNIFICANT CHANGE UP
-  CEFOXITIN: SIGNIFICANT CHANGE UP
-  CEFTRIAXONE: SIGNIFICANT CHANGE UP
-  CEFTRIAXONE: SIGNIFICANT CHANGE UP
-  CIPROFLOXACIN: SIGNIFICANT CHANGE UP
-  CIPROFLOXACIN: SIGNIFICANT CHANGE UP
-  CLINDAMYCIN: SIGNIFICANT CHANGE UP
-  ERTAPENEM: SIGNIFICANT CHANGE UP
-  ERTAPENEM: SIGNIFICANT CHANGE UP
-  ERYTHROMYCIN: SIGNIFICANT CHANGE UP
-  GENTAMICIN: SIGNIFICANT CHANGE UP
-  IMIPENEM: SIGNIFICANT CHANGE UP
-  LEVOFLOXACIN: SIGNIFICANT CHANGE UP
-  LEVOFLOXACIN: SIGNIFICANT CHANGE UP
-  MEROPENEM: SIGNIFICANT CHANGE UP
-  MEROPENEM: SIGNIFICANT CHANGE UP
-  OXACILLIN: SIGNIFICANT CHANGE UP
-  PENICILLIN: SIGNIFICANT CHANGE UP
-  PIPERACILLIN/TAZOBACTAM: SIGNIFICANT CHANGE UP
-  PIPERACILLIN/TAZOBACTAM: SIGNIFICANT CHANGE UP
-  RIFAMPIN: SIGNIFICANT CHANGE UP
-  TETRACYCLINE: SIGNIFICANT CHANGE UP
-  TOBRAMYCIN: SIGNIFICANT CHANGE UP
-  TOBRAMYCIN: SIGNIFICANT CHANGE UP
-  TRIMETHOPRIM/SULFAMETHOXAZOLE: SIGNIFICANT CHANGE UP
-  VANCOMYCIN: SIGNIFICANT CHANGE UP
CULTURE RESULTS: SIGNIFICANT CHANGE UP
GRAM STN FLD: SIGNIFICANT CHANGE UP
GRAM STN FLD: SIGNIFICANT CHANGE UP
METHOD TYPE: SIGNIFICANT CHANGE UP
ORGANISM # SPEC MICROSCOPIC CNT: SIGNIFICANT CHANGE UP
SPECIMEN SOURCE: SIGNIFICANT CHANGE UP
SPECIMEN SOURCE: SIGNIFICANT CHANGE UP

## 2020-12-15 LAB
-  AMPICILLIN/SULBACTAM: SIGNIFICANT CHANGE UP
-  CEFAZOLIN: SIGNIFICANT CHANGE UP
-  CLINDAMYCIN: SIGNIFICANT CHANGE UP
-  ERYTHROMYCIN: SIGNIFICANT CHANGE UP
-  GENTAMICIN: SIGNIFICANT CHANGE UP
-  OXACILLIN: SIGNIFICANT CHANGE UP
-  PENICILLIN: SIGNIFICANT CHANGE UP
-  RIFAMPIN: SIGNIFICANT CHANGE UP
-  TETRACYCLINE: SIGNIFICANT CHANGE UP
-  TRIMETHOPRIM/SULFAMETHOXAZOLE: SIGNIFICANT CHANGE UP
-  VANCOMYCIN: SIGNIFICANT CHANGE UP
CULTURE RESULTS: SIGNIFICANT CHANGE UP
CULTURE RESULTS: SIGNIFICANT CHANGE UP
METHOD TYPE: SIGNIFICANT CHANGE UP
ORGANISM # SPEC MICROSCOPIC CNT: SIGNIFICANT CHANGE UP
SPECIMEN SOURCE: SIGNIFICANT CHANGE UP

## 2021-08-09 NOTE — PROCEDURE NOTE - NSPROCNAME_GEN_A_CORE
Arterial Puncture/Cannulation
Cardioversion
Central Line Insertion
Central Line Insertion
Gastric Intubation/Gastric Lavage
Tracheal Intubation
Tracheal Intubation
No cyanosis, clubbing or edema
